# Patient Record
Sex: MALE | Race: BLACK OR AFRICAN AMERICAN | Employment: OTHER | URBAN - METROPOLITAN AREA
[De-identification: names, ages, dates, MRNs, and addresses within clinical notes are randomized per-mention and may not be internally consistent; named-entity substitution may affect disease eponyms.]

---

## 2020-02-25 ENCOUNTER — HOSPITAL ENCOUNTER (INPATIENT)
Age: 40
LOS: 2 days | Discharge: HOME | DRG: 392 | End: 2020-02-28
Attending: EMERGENCY MEDICINE | Admitting: INTERNAL MEDICINE
Payer: COMMERCIAL

## 2020-02-25 DIAGNOSIS — K80.20 CALCULUS OF GALLBLADDER WITHOUT CHOLECYSTITIS WITHOUT OBSTRUCTION: ICD-10-CM

## 2020-02-25 DIAGNOSIS — R10.9 ACUTE ABDOMINAL PAIN: Primary | ICD-10-CM

## 2020-02-25 LAB
ALBUMIN SERPL-MCNC: 4.7 G/DL (ref 3.5–5)
ALBUMIN/GLOB SERPL: 1.1 {RATIO} (ref 1.2–3.5)
ALP SERPL-CCNC: 73 U/L (ref 50–136)
ALT SERPL-CCNC: 68 U/L (ref 12–65)
ANION GAP SERPL CALC-SCNC: 12 MMOL/L (ref 7–16)
AST SERPL-CCNC: 44 U/L (ref 15–37)
BASOPHILS # BLD: 0 K/UL (ref 0–0.2)
BASOPHILS NFR BLD: 0 % (ref 0–2)
BILIRUB SERPL-MCNC: 1 MG/DL (ref 0.2–1.1)
BUN SERPL-MCNC: 16 MG/DL (ref 6–23)
CALCIUM SERPL-MCNC: 10.3 MG/DL (ref 8.3–10.4)
CHLORIDE SERPL-SCNC: 101 MMOL/L (ref 98–107)
CO2 SERPL-SCNC: 24 MMOL/L (ref 21–32)
CREAT SERPL-MCNC: 1.31 MG/DL (ref 0.8–1.5)
DIFFERENTIAL METHOD BLD: ABNORMAL
EOSINOPHIL # BLD: 0 K/UL (ref 0–0.8)
EOSINOPHIL NFR BLD: 0 % (ref 0.5–7.8)
ERYTHROCYTE [DISTWIDTH] IN BLOOD BY AUTOMATED COUNT: 13 % (ref 11.9–14.6)
GLOBULIN SER CALC-MCNC: 4.2 G/DL (ref 2.3–3.5)
GLUCOSE SERPL-MCNC: 114 MG/DL (ref 65–100)
HCT VFR BLD AUTO: 48.8 % (ref 41.1–50.3)
HGB BLD-MCNC: 16.7 G/DL (ref 13.6–17.2)
IMM GRANULOCYTES # BLD AUTO: 0 K/UL (ref 0–0.5)
IMM GRANULOCYTES NFR BLD AUTO: 0 % (ref 0–5)
LIPASE SERPL-CCNC: 159 U/L (ref 73–393)
LYMPHOCYTES # BLD: 1 K/UL (ref 0.5–4.6)
LYMPHOCYTES NFR BLD: 9 % (ref 13–44)
MCH RBC QN AUTO: 28.9 PG (ref 26.1–32.9)
MCHC RBC AUTO-ENTMCNC: 34.2 G/DL (ref 31.4–35)
MCV RBC AUTO: 84.4 FL (ref 79.6–97.8)
MONOCYTES # BLD: 0.6 K/UL (ref 0.1–1.3)
MONOCYTES NFR BLD: 5 % (ref 4–12)
NEUTS SEG # BLD: 9.4 K/UL (ref 1.7–8.2)
NEUTS SEG NFR BLD: 85 % (ref 43–78)
NRBC # BLD: 0 K/UL (ref 0–0.2)
PLATELET # BLD AUTO: 213 K/UL (ref 150–450)
PMV BLD AUTO: 10.5 FL (ref 9.4–12.3)
POTASSIUM SERPL-SCNC: 3.9 MMOL/L (ref 3.5–5.1)
PROT SERPL-MCNC: 8.9 G/DL (ref 6.3–8.2)
RBC # BLD AUTO: 5.78 M/UL (ref 4.23–5.6)
SODIUM SERPL-SCNC: 137 MMOL/L (ref 136–145)
WBC # BLD AUTO: 11.1 K/UL (ref 4.3–11.1)

## 2020-02-25 PROCEDURE — 83690 ASSAY OF LIPASE: CPT

## 2020-02-25 PROCEDURE — 96375 TX/PRO/DX INJ NEW DRUG ADDON: CPT

## 2020-02-25 PROCEDURE — 99285 EMERGENCY DEPT VISIT HI MDM: CPT

## 2020-02-25 PROCEDURE — 80307 DRUG TEST PRSMV CHEM ANLYZR: CPT

## 2020-02-25 PROCEDURE — 96374 THER/PROPH/DIAG INJ IV PUSH: CPT

## 2020-02-25 PROCEDURE — 85025 COMPLETE CBC W/AUTO DIFF WBC: CPT

## 2020-02-25 PROCEDURE — 93005 ELECTROCARDIOGRAM TRACING: CPT | Performed by: EMERGENCY MEDICINE

## 2020-02-25 PROCEDURE — 80053 COMPREHEN METABOLIC PANEL: CPT

## 2020-02-25 PROCEDURE — 74011250636 HC RX REV CODE- 250/636: Performed by: EMERGENCY MEDICINE

## 2020-02-25 PROCEDURE — 84484 ASSAY OF TROPONIN QUANT: CPT

## 2020-02-25 RX ORDER — KETOROLAC TROMETHAMINE 30 MG/ML
30 INJECTION, SOLUTION INTRAMUSCULAR; INTRAVENOUS
Status: COMPLETED | OUTPATIENT
Start: 2020-02-25 | End: 2020-02-25

## 2020-02-25 RX ORDER — FLUOXETINE HYDROCHLORIDE 20 MG/1
20 CAPSULE ORAL DAILY
COMMUNITY

## 2020-02-25 RX ORDER — SODIUM CHLORIDE 0.9 % (FLUSH) 0.9 %
10 SYRINGE (ML) INJECTION
Status: COMPLETED | OUTPATIENT
Start: 2020-02-25 | End: 2020-02-26

## 2020-02-25 RX ORDER — MELOXICAM 7.5 MG/1
7.5 TABLET ORAL DAILY
COMMUNITY

## 2020-02-25 RX ORDER — HYDROMORPHONE HYDROCHLORIDE 1 MG/ML
0.5 INJECTION, SOLUTION INTRAMUSCULAR; INTRAVENOUS; SUBCUTANEOUS
Status: COMPLETED | OUTPATIENT
Start: 2020-02-25 | End: 2020-02-25

## 2020-02-25 RX ORDER — CARISOPRODOL 350 MG/1
350 TABLET ORAL 4 TIMES DAILY
COMMUNITY

## 2020-02-25 RX ORDER — ONDANSETRON 2 MG/ML
4 INJECTION INTRAMUSCULAR; INTRAVENOUS
Status: COMPLETED | OUTPATIENT
Start: 2020-02-25 | End: 2020-02-25

## 2020-02-25 RX ADMIN — HYDROMORPHONE HYDROCHLORIDE 0.5 MG: 1 INJECTION, SOLUTION INTRAMUSCULAR; INTRAVENOUS; SUBCUTANEOUS at 23:17

## 2020-02-25 RX ADMIN — KETOROLAC TROMETHAMINE 30 MG: 30 INJECTION, SOLUTION INTRAMUSCULAR at 23:17

## 2020-02-25 RX ADMIN — ONDANSETRON 4 MG: 2 INJECTION INTRAMUSCULAR; INTRAVENOUS at 23:17

## 2020-02-26 ENCOUNTER — APPOINTMENT (OUTPATIENT)
Dept: GENERAL RADIOLOGY | Age: 40
DRG: 392 | End: 2020-02-26
Attending: EMERGENCY MEDICINE
Payer: COMMERCIAL

## 2020-02-26 ENCOUNTER — APPOINTMENT (OUTPATIENT)
Dept: CT IMAGING | Age: 40
DRG: 392 | End: 2020-02-26
Attending: EMERGENCY MEDICINE
Payer: COMMERCIAL

## 2020-02-26 ENCOUNTER — APPOINTMENT (OUTPATIENT)
Dept: ULTRASOUND IMAGING | Age: 40
DRG: 392 | End: 2020-02-26
Attending: EMERGENCY MEDICINE
Payer: COMMERCIAL

## 2020-02-26 PROBLEM — R74.01 TRANSAMINITIS: Status: ACTIVE | Noted: 2020-02-26

## 2020-02-26 PROBLEM — K52.9 ENTERITIS: Status: ACTIVE | Noted: 2020-02-26

## 2020-02-26 PROBLEM — K80.20 CHOLELITHIASIS: Status: ACTIVE | Noted: 2020-02-26

## 2020-02-26 PROBLEM — F32.9 MDD (MAJOR DEPRESSIVE DISORDER): Status: ACTIVE | Noted: 2020-02-26

## 2020-02-26 LAB
ATRIAL RATE: 145 BPM
CALCULATED P AXIS, ECG09: 48 DEGREES
CALCULATED R AXIS, ECG10: 46 DEGREES
CALCULATED T AXIS, ECG11: 56 DEGREES
DIAGNOSIS, 93000: NORMAL
ETHANOL SERPL-MCNC: <3 MG/DL
P-R INTERVAL, ECG05: 160 MS
Q-T INTERVAL, ECG07: 280 MS
QRS DURATION, ECG06: 84 MS
QTC CALCULATION (BEZET), ECG08: 434 MS
TROPONIN I SERPL-MCNC: <0.02 NG/ML (ref 0.02–0.05)
VENTRICULAR RATE, ECG03: 145 BPM

## 2020-02-26 PROCEDURE — 99218 HC RM OBSERVATION: CPT

## 2020-02-26 PROCEDURE — 74011250636 HC RX REV CODE- 250/636: Performed by: EMERGENCY MEDICINE

## 2020-02-26 PROCEDURE — 65270000029 HC RM PRIVATE

## 2020-02-26 PROCEDURE — 0107U C DIFF TOX AG DETCJ IA STOOL: CPT

## 2020-02-26 PROCEDURE — 74011250636 HC RX REV CODE- 250/636: Performed by: INTERNAL MEDICINE

## 2020-02-26 PROCEDURE — 96376 TX/PRO/DX INJ SAME DRUG ADON: CPT

## 2020-02-26 PROCEDURE — 74011636320 HC RX REV CODE- 636/320: Performed by: EMERGENCY MEDICINE

## 2020-02-26 PROCEDURE — 80074 ACUTE HEPATITIS PANEL: CPT

## 2020-02-26 PROCEDURE — 36600 WITHDRAWAL OF ARTERIAL BLOOD: CPT

## 2020-02-26 PROCEDURE — 87046 STOOL CULTR AEROBIC BACT EA: CPT

## 2020-02-26 PROCEDURE — 87177 OVA AND PARASITES SMEARS: CPT

## 2020-02-26 PROCEDURE — 87389 HIV-1 AG W/HIV-1&-2 AB AG IA: CPT

## 2020-02-26 PROCEDURE — 87045 FECES CULTURE AEROBIC BACT: CPT

## 2020-02-26 PROCEDURE — 74011250637 HC RX REV CODE- 250/637: Performed by: EMERGENCY MEDICINE

## 2020-02-26 PROCEDURE — 74177 CT ABD & PELVIS W/CONTRAST: CPT

## 2020-02-26 PROCEDURE — 74011250637 HC RX REV CODE- 250/637: Performed by: INTERNAL MEDICINE

## 2020-02-26 PROCEDURE — 76705 ECHO EXAM OF ABDOMEN: CPT

## 2020-02-26 PROCEDURE — 71045 X-RAY EXAM CHEST 1 VIEW: CPT

## 2020-02-26 PROCEDURE — 74011000258 HC RX REV CODE- 258: Performed by: EMERGENCY MEDICINE

## 2020-02-26 PROCEDURE — 74011250637 HC RX REV CODE- 250/637: Performed by: PHYSICIAN ASSISTANT

## 2020-02-26 RX ORDER — ONDANSETRON 2 MG/ML
4 INJECTION INTRAMUSCULAR; INTRAVENOUS
Status: DISCONTINUED | OUTPATIENT
Start: 2020-02-26 | End: 2020-02-28 | Stop reason: HOSPADM

## 2020-02-26 RX ORDER — METRONIDAZOLE 500 MG/1
500 TABLET ORAL EVERY 12 HOURS
Status: DISCONTINUED | OUTPATIENT
Start: 2020-02-26 | End: 2020-02-28 | Stop reason: HOSPADM

## 2020-02-26 RX ORDER — SODIUM CHLORIDE 9 MG/ML
125 INJECTION, SOLUTION INTRAVENOUS CONTINUOUS
Status: DISCONTINUED | OUTPATIENT
Start: 2020-02-26 | End: 2020-02-26

## 2020-02-26 RX ORDER — SODIUM CHLORIDE, SODIUM LACTATE, POTASSIUM CHLORIDE, CALCIUM CHLORIDE 600; 310; 30; 20 MG/100ML; MG/100ML; MG/100ML; MG/100ML
200 INJECTION, SOLUTION INTRAVENOUS ONCE
Status: COMPLETED | OUTPATIENT
Start: 2020-02-26 | End: 2020-02-26

## 2020-02-26 RX ORDER — FLUOXETINE HYDROCHLORIDE 20 MG/1
20 CAPSULE ORAL DAILY
Status: DISCONTINUED | OUTPATIENT
Start: 2020-02-26 | End: 2020-02-28 | Stop reason: HOSPADM

## 2020-02-26 RX ORDER — CIPROFLOXACIN 500 MG/1
500 TABLET ORAL EVERY 12 HOURS
Status: DISCONTINUED | OUTPATIENT
Start: 2020-02-26 | End: 2020-02-28 | Stop reason: HOSPADM

## 2020-02-26 RX ORDER — HYDROMORPHONE HYDROCHLORIDE 2 MG/1
1 TABLET ORAL
Status: DISCONTINUED | OUTPATIENT
Start: 2020-02-26 | End: 2020-02-28 | Stop reason: HOSPADM

## 2020-02-26 RX ORDER — SODIUM CHLORIDE 0.9 % (FLUSH) 0.9 %
5-40 SYRINGE (ML) INJECTION EVERY 8 HOURS
Status: DISCONTINUED | OUTPATIENT
Start: 2020-02-26 | End: 2020-02-28 | Stop reason: HOSPADM

## 2020-02-26 RX ORDER — HYOSCYAMINE SULFATE 0.12 MG/1
0.25 TABLET SUBLINGUAL
Status: COMPLETED | OUTPATIENT
Start: 2020-02-26 | End: 2020-02-26

## 2020-02-26 RX ORDER — HYDROMORPHONE HYDROCHLORIDE 1 MG/ML
0.5 INJECTION, SOLUTION INTRAMUSCULAR; INTRAVENOUS; SUBCUTANEOUS
Status: COMPLETED | OUTPATIENT
Start: 2020-02-26 | End: 2020-02-26

## 2020-02-26 RX ORDER — SODIUM CHLORIDE 0.9 % (FLUSH) 0.9 %
5-40 SYRINGE (ML) INJECTION AS NEEDED
Status: DISCONTINUED | OUTPATIENT
Start: 2020-02-26 | End: 2020-02-28 | Stop reason: HOSPADM

## 2020-02-26 RX ORDER — SODIUM CHLORIDE, SODIUM LACTATE, POTASSIUM CHLORIDE, CALCIUM CHLORIDE 600; 310; 30; 20 MG/100ML; MG/100ML; MG/100ML; MG/100ML
1000 INJECTION, SOLUTION INTRAVENOUS ONCE
Status: COMPLETED | OUTPATIENT
Start: 2020-02-26 | End: 2020-02-26

## 2020-02-26 RX ORDER — ONDANSETRON 2 MG/ML
4 INJECTION INTRAMUSCULAR; INTRAVENOUS
Status: COMPLETED | OUTPATIENT
Start: 2020-02-26 | End: 2020-02-26

## 2020-02-26 RX ORDER — HYDROMORPHONE HYDROCHLORIDE 1 MG/ML
0.2 INJECTION, SOLUTION INTRAMUSCULAR; INTRAVENOUS; SUBCUTANEOUS
Status: COMPLETED | OUTPATIENT
Start: 2020-02-26 | End: 2020-02-26

## 2020-02-26 RX ORDER — OXYCODONE HYDROCHLORIDE 5 MG/1
5 TABLET ORAL
Status: DISCONTINUED | OUTPATIENT
Start: 2020-02-26 | End: 2020-02-28 | Stop reason: HOSPADM

## 2020-02-26 RX ADMIN — HYOSCYAMINE SULFATE 0.25 MG: 0.12 TABLET ORAL; SUBLINGUAL at 01:07

## 2020-02-26 RX ADMIN — CIPROFLOXACIN HYDROCHLORIDE 500 MG: 500 TABLET, FILM COATED ORAL at 21:10

## 2020-02-26 RX ADMIN — METRONIDAZOLE 500 MG: 500 TABLET ORAL at 18:19

## 2020-02-26 RX ADMIN — HYDROMORPHONE HYDROCHLORIDE 0.2 MG: 1 INJECTION, SOLUTION INTRAMUSCULAR; INTRAVENOUS; SUBCUTANEOUS at 07:26

## 2020-02-26 RX ADMIN — HYDROMORPHONE HYDROCHLORIDE 1 MG: 2 TABLET ORAL at 15:37

## 2020-02-26 RX ADMIN — Medication 10 ML: at 00:00

## 2020-02-26 RX ADMIN — SODIUM CHLORIDE 100 ML: 900 INJECTION, SOLUTION INTRAVENOUS at 00:00

## 2020-02-26 RX ADMIN — SODIUM CHLORIDE 125 ML/HR: 900 INJECTION, SOLUTION INTRAVENOUS at 08:41

## 2020-02-26 RX ADMIN — HYDROMORPHONE HYDROCHLORIDE 0.5 MG: 1 INJECTION, SOLUTION INTRAMUSCULAR; INTRAVENOUS; SUBCUTANEOUS at 00:38

## 2020-02-26 RX ADMIN — Medication 5 ML: at 21:12

## 2020-02-26 RX ADMIN — SODIUM CHLORIDE, SODIUM LACTATE, POTASSIUM CHLORIDE, AND CALCIUM CHLORIDE 1000 ML: 600; 310; 30; 20 INJECTION, SOLUTION INTRAVENOUS at 02:30

## 2020-02-26 RX ADMIN — SODIUM CHLORIDE, SODIUM LACTATE, POTASSIUM CHLORIDE, AND CALCIUM CHLORIDE 200 ML/HR: 600; 310; 30; 20 INJECTION, SOLUTION INTRAVENOUS at 00:38

## 2020-02-26 RX ADMIN — IOPAMIDOL 100 ML: 755 INJECTION, SOLUTION INTRAVENOUS at 00:00

## 2020-02-26 RX ADMIN — FLUOXETINE 20 MG: 20 CAPSULE ORAL at 15:06

## 2020-02-26 RX ADMIN — ONDANSETRON 4 MG: 2 INJECTION INTRAMUSCULAR; INTRAVENOUS at 16:24

## 2020-02-26 RX ADMIN — ONDANSETRON 4 MG: 2 INJECTION INTRAMUSCULAR; INTRAVENOUS at 04:41

## 2020-02-26 NOTE — H&P
HOSPITALIST H&P/CONSULT  NAME:  Loretta Mohr   Age:  44 y.o.  :   1980   MRN:   [id number]  PCP: Gina Simpson MD  Consulting MD:  Treatment Team: Attending Provider: Joselyn Billnigs DO  HPI:   45 yo AAM with past history of chronic low back pain and MDD presents with worsening abdominal pain that started yesterday afternoon. He said \"I ate a hamburger around noon and then had some abdominal pain. I thought nothing of it but it kept getting worse and then it woke me up and it was so bad I had to come in.\" Patient's mother at the bedside as well. He has had nausea without vomiting. No one else around him has the same symptoms. He denies fevers/chills, chest pain, shortness of breath, or diarrhea. No history of kidney stones. He said he has some low back pain from an injury. Patient denies recreational drug use and says \"I'll drink alcohol every couple of weeks mainly on the weekends. \"     ED Course: bloodwork unremarkable except for very mild transaminitis, troponin negative x1. EKG showing sinus tachycardia. CT abdomen showing possible transverse enteritis, abdominal ultrasound showing cholilithiasis without acute cholecystitis and fatty liver. Given dilaudid, ketorolac, fluids, and Zofran. General surgery consulted with recommendations for hospitalist to admit and will follow as consult. Hospitalist paged for admission. Complete ROS done and is as stated in HPI or otherwise negative  Past Medical History:   Diagnosis Date    Back pain     Depression     Psychiatric disorder       History reviewed. No pertinent surgical history. Prior to Admission Medications   Prescriptions Last Dose Informant Patient Reported? Taking? FLUoxetine (PROZAC) 20 mg capsule   Yes Yes   Sig: Take 20 mg by mouth daily. carisoprodoL (SOMA) 350 mg tablet   Yes Yes   Sig: Take 350 mg by mouth four (4) times daily. meloxicam (MOBIC) 7.5 mg tablet   Yes Yes   Sig: Take 7.5 mg by mouth daily. Facility-Administered Medications: None     Allergies   Allergen Reactions    Flexeril [Cyclobenzaprine] Rash      Social History     Tobacco Use    Smoking status: Never Smoker    Smokeless tobacco: Never Used   Substance Use Topics    Alcohol use: Yes     Comment: occassionally      History reviewed. No pertinent family history. Objective:     Visit Vitals  /84   Pulse 86   Temp 98.5 °F (36.9 °C)   Resp 20   Ht 5' 10\" (1.778 m)   Wt 96.2 kg (212 lb)   SpO2 95%   BMI 30.42 kg/m²      Temp (24hrs), Av.5 °F (36.9 °C), Min:98.5 °F (36.9 °C), Max:98.5 °F (36.9 °C)    Oxygen Therapy  O2 Sat (%): 95 % (20 0645)  Pulse via Oximetry: 99 beats per minute (20 0645)  O2 Device: Room air (20 2300)  Physical Exam:  General:    Alert, cooperative, no distress, appears stated age. Head:   Normocephalic, without obvious abnormality, atraumatic. Nose:  Nares normal. No drainage or sinus tenderness. Lungs:   Clear to auscultation bilaterally. No Wheezing or Rhonchi. No rales. Heart:   Regular rate and rhythm,  no murmur, rub or gallop. Abdomen:   Soft, moderate TTP along RUQ and RLL without rebound tenderness. No fluid wave. Negative Reynoso sign. No CVA tenderness. Extremities: No cyanosis. No edema. No clubbing  Skin:     Texture, turgor normal. No rashes or lesions. Not Jaundiced  Neurologic: Alert and oriented x 3, no focal deficits   Data Review:   Recent Results (from the past 24 hour(s))   EKG, 12 LEAD, INITIAL    Collection Time: 20 11:03 PM   Result Value Ref Range    Ventricular Rate 145 BPM    Atrial Rate 145 BPM    P-R Interval 160 ms    QRS Duration 84 ms    Q-T Interval 280 ms    QTC Calculation (Bezet) 434 ms    Calculated P Axis 48 degrees    Calculated R Axis 46 degrees    Calculated T Axis 56 degrees    Diagnosis       !! AGE AND GENDER SPECIFIC ECG ANALYSIS !!   Sinus tachycardia  Septal infarct , age undetermined  Abnormal ECG  No previous ECGs available METABOLIC PANEL, COMPREHENSIVE    Collection Time: 02/25/20 11:07 PM   Result Value Ref Range    Sodium 137 136 - 145 mmol/L    Potassium 3.9 3.5 - 5.1 mmol/L    Chloride 101 98 - 107 mmol/L    CO2 24 21 - 32 mmol/L    Anion gap 12 7 - 16 mmol/L    Glucose 114 (H) 65 - 100 mg/dL    BUN 16 6 - 23 MG/DL    Creatinine 1.31 0.8 - 1.5 MG/DL    GFR est AA >60 >60 ml/min/1.73m2    GFR est non-AA >60 >60 ml/min/1.73m2    Calcium 10.3 8.3 - 10.4 MG/DL    Bilirubin, total 1.0 0.2 - 1.1 MG/DL    ALT (SGPT) 68 (H) 12 - 65 U/L    AST (SGOT) 44 (H) 15 - 37 U/L    Alk. phosphatase 73 50 - 136 U/L    Protein, total 8.9 (H) 6.3 - 8.2 g/dL    Albumin 4.7 3.5 - 5.0 g/dL    Globulin 4.2 (H) 2.3 - 3.5 g/dL    A-G Ratio 1.1 (L) 1.2 - 3.5     LIPASE    Collection Time: 02/25/20 11:07 PM   Result Value Ref Range    Lipase 159 73 - 393 U/L   CBC WITH AUTOMATED DIFF    Collection Time: 02/25/20 11:07 PM   Result Value Ref Range    WBC 11.1 4.3 - 11.1 K/uL    RBC 5.78 (H) 4.23 - 5.6 M/uL    HGB 16.7 13.6 - 17.2 g/dL    HCT 48.8 41.1 - 50.3 %    MCV 84.4 79.6 - 97.8 FL    MCH 28.9 26.1 - 32.9 PG    MCHC 34.2 31.4 - 35.0 g/dL    RDW 13.0 11.9 - 14.6 %    PLATELET 878 784 - 960 K/uL    MPV 10.5 9.4 - 12.3 FL    ABSOLUTE NRBC 0.00 0.0 - 0.2 K/uL    DF AUTOMATED      NEUTROPHILS 85 (H) 43 - 78 %    LYMPHOCYTES 9 (L) 13 - 44 %    MONOCYTES 5 4.0 - 12.0 %    EOSINOPHILS 0 (L) 0.5 - 7.8 %    BASOPHILS 0 0.0 - 2.0 %    IMMATURE GRANULOCYTES 0 0.0 - 5.0 %    ABS. NEUTROPHILS 9.4 (H) 1.7 - 8.2 K/UL    ABS. LYMPHOCYTES 1.0 0.5 - 4.6 K/UL    ABS. MONOCYTES 0.6 0.1 - 1.3 K/UL    ABS. EOSINOPHILS 0.0 0.0 - 0.8 K/UL    ABS. BASOPHILS 0.0 0.0 - 0.2 K/UL    ABS. IMM. GRANS. 0.0 0.0 - 0.5 K/UL   TROPONIN I    Collection Time: 02/25/20 11:07 PM   Result Value Ref Range    Troponin-I, Qt. <0.02 (L) 0.02 - 0.05 NG/ML     Imaging /Procedures /Studies     Assessment and Plan:      Active Hospital Problems    Diagnosis Date Noted    Cholelithiasis 02/26/2020    MDD (major depressive disorder) 02/26/2020    Enteritis 02/26/2020    Transaminitis 02/26/2020       PLAN    # Worsening abdominal pain with mild transaminitis and fatty liver and possible enteritis on CT imaging  - general surgery consulted and will follow  - keep NPO  - start IVFs  - will hold off on antibiotics for now, if clinically worsens then can start  - ordered hepatitis panel, HIV, UDS, and alcohol level  - trend LFTs    # History of MDD  - continue Prozac    F/E/N: maintenance fluids, replete electrolytes as needed, NPO    Ppx: SCDs for VTE    Code Status: FULL CODE    Disposition: Admit to OBS with plan as above. Discussed with patient and mother at bedside. All questions answered.      Signed By: Alfred Faulkner DO     February 26, 2020

## 2020-02-26 NOTE — CONSULTS
[facility] Consult Note       Primary GI Physician: Eduin Jones MD (new)  Referring Provider:  Michelle Bowers MD    Consult Date:  2/26/2020  Admit Date:  2/25/2020    Chief Complaint:  Enteritis    Subjective:     History of Present Illness:  Patient is a 44 y.o. male with PMH of depression, who is seen in consultation at the request of Dr. Preston Deal for further evaluation of enteritis. Patient presented to the ED  after eating a hamburger for lunch yesterday. Abdominal pain worsened and devloped nausea. + diarrhea which started in the ED. No bleeding or melena. WBC in ED 11, Tbili 1, ALT 68, AST 44, Alk Phos WNL. He does drink alcohol socially, but has not had a drink in 2 weeks. CT abdomen demonstrates enteritis. RUQ US demonstrates gallstone, no evidence of acute cholecystitis. General surgery was consulted for cholecystectomy. Patient denies having any GI issues prior to onset of symptoms. No FHx of IBD. No prior GI work-up. Patient is in [employer] and 2 of his friends who joined him for lunch also developed GI issues - N/V/D. CTAP 2/26/2020:  IMPRESSION:     1. Moderate fluid volume in the ascending and transverse colon, nonspecific but  may be secondary to underlying enteritis.     2. No evidence of appendicitis, colitis, diverticulitis or bowel obstruction. No  renal calculus or hydronephrosis.     US Abd 2/26/2020:  IMPRESSION:     1. Cholelithiasis without sonographic evidence of acute cholecystitis.     2. Fatty liver. No evidence of biliary ductal dilatation.       PMH:  Past Medical History:   Diagnosis Date    Back pain     Depression     Psychiatric disorder        PSH:  History reviewed. No pertinent surgical history. Allergies: Allergies   Allergen Reactions    Flexeril [Cyclobenzaprine] Rash       Home Medications:  Prior to Admission medications    Medication Sig Start Date End Date Taking?  Authorizing Provider   FLUoxetine (PROZAC) 20 mg capsule Take 20 mg by mouth daily. Yes Other, MD [place]   carisoprodoL (SOMA) 350 mg tablet Take 350 mg by mouth four (4) times daily. Yes Other, MD [place]   meloxicam (MOBIC) 7.5 mg tablet Take 7.5 mg by mouth daily. Yes Other, MD [place]       Hospital Medications:  Current Facility-Administered Medications   Medication Dose Route Frequency    sodium chloride (NS) flush 5-40 mL  5-40 mL IntraVENous Q8H    sodium chloride (NS) flush 5-40 mL  5-40 mL IntraVENous PRN    ondansetron (ZOFRAN) injection 4 mg  4 mg IntraVENous Q4H PRN    FLUoxetine (PROzac) capsule 20 mg  20 mg Oral DAILY    0.9% sodium chloride infusion  125 mL/hr IntraVENous CONTINUOUS    oxyCODONE IR (ROXICODONE) tablet 5 mg  5 mg Oral Q6H PRN    HYDROmorphone (DILAUDID) tablet 1 mg  1 mg Oral Q6H PRN       Social History:  Social History     Tobacco Use    Smoking status: Never Smoker    Smokeless tobacco: Never Used   Substance Use Topics    Alcohol use: Yes     Comment: occassionally       Pt denies any history of drug use, blood transfusions, or tattoos. Family History:  History reviewed. No pertinent family history. Review of Systems:  A detailed 10 system ROS is obtained, with pertinent positives as listed above. All others are negative. Diet:      Objective:     Physical Exam:  Vitals:  Visit Vitals  /84 (BP 1 Location: Right arm, BP Patient Position: At rest;Supine)   Pulse 93   Temp 98.2 °F (36.8 °C)   Resp 18   Ht 5' 10\" (1.778 m)   Wt 96.2 kg (212 lb)   SpO2 96%   BMI 30.42 kg/m²     Gen:  Pt is alert, cooperative, no acute distress  Skin:  Extremities and face reveal no rashes. HEENT: Sclerae anicteric. Extra-occular muscles are intact. No oral ulcers. No abnormal pigmentation of the lips. The neck is supple. Cardiovascular: Regular rate and rhythm. No murmurs, gallops, or rubs. Respiratory:  Comfortable breathing with no accessory muscle use. Clear breath sounds anteriorly with no wheezes, rales, or rhonchi.   GI:  Abdomen nondistended, soft, and MILDLY TTP DIFFUSELY < right side with evette efullness noted. No G/R. Normal active bowel sounds. No enlargement of the liver or spleen. No masses palpable. Rectal:  Deferred  Musculoskeletal:  No pitting edema of the lower legs. Neurological:  Gross memory appears intact. Patient is alert and oriented. Psychiatric:  Mood appears appropriate with judgement intact. Lymphatic:  No cervical or supraclavicular adenopathy. Laboratory:    Recent Labs     02/25/20  2307   WBC 11.1   HGB 16.7   HCT 48.8      MCV 84.4      K 3.9      CO2 24   BUN 16   CREA 1.31   CA 10.3   *   AP 73   SGOT 44*   ALT 68*   TBILI 1.0   ALB 4.7   TP 8.9*   LPSE 159          Assessment:     Principal Problem:    Enteritis (2/26/2020)    Active Problems:    Cholelithiasis (2/26/2020)      MDD (major depressive disorder) (2/26/2020)      Transaminitis (2/26/2020)    Generally healthy 45 y/o M who presents with recent onset of RUQ abdominal pain after eating a hamburger yesterday. Now having nausea and diarrhea since ED visit/admission. He has 2 friends who ate lunch with him having similar symptoms. Suspect infectious enteritis/food poisoning. Less likely IBD. Surgery on board, \"No clear evidence that current symptoms are GB related. \" Cholelithiasis, no evidence of acute cholecystitis. Noted elevated transaminases. Probably fatty liver related. T bili and AP normal. Ddx: viral hepatitis, current infectious process, etc.     Plan:     May have clear liquids. Pain mgt per primary. Antiemetics. Trend LFTs, monitor, FU viral hepatitis results, consider expanding liver disease work-up base on clinical course. Check stools for enteric pathogens. If negative, may have imodium prn. Oral Cipro and Flagyl. Further recs based on pt's clinical course. Patient is seen and examined in collaboration with Dr. Sosa Sequeira. Assessment and plan as per Dr. David Parsons.       Braxton Richmond PA-C  [facility]

## 2020-02-26 NOTE — PROGRESS NOTES
SW reviewed patient's chart and conducted a baseline assessment. Discharge plan at this time is as follows:     Care Management Interventions  PCP Verified by CM:  Yes  Mode of Transport at Discharge: Self  Transition of Care Consult (CM Consult): Discharge Planning  Current Support Network: Own Home  Confirm Follow Up Transport: Family  Discharge Location  Discharge Placement: Home with family assistance    Bouchra Louis, [address] Work   [address] [facility]  [email or web address]

## 2020-02-26 NOTE — CONSULTS
[address]  [place], [address] [facility]  [phone]    H&P/Consult Note/Progress Note:   Joy Warren  MRN: [id number]  :1980  Age:39 y.o.    HPI: Joy Warren is a 44 y.o. male who we are asked by the ED to see for cholelithiasis. Pt reports mild RUQ pain that began after eating a hamburger at lunch yesterday. He had a usual hamburger at [facility] and felt fine for 5 1/2 hours. He tried to burp and vomit without success. He thought he was having GERD, but central abdominal pain became worse. His pain worsened throughout the night and became diffuse abdominal pain. He reports nausea but denies vomiting, fever, or diarrhea. He denies jaundice or change in color of urine. His abdominal pain is controlled with pain medication in the ED. He also c/o low back pain, which is chronic. He denies h/o abdominal surgeries. Pt was incontinent of stool in ED. He has now had many episodes of watery diarrhea that he is blaming on IVFs. He also found out that 2 other [employer] personnel that he ate with have same symptoms and are being diagnosed as having food poisoning. WBC in ED 11, Tbili 1, ALT 68, AST 44, Alk Phos WNL. He does drink alcohol socially, but has not had a drink in 2 weeks. Unknown history of gallstones or abnormal LFTs. CT abdomen demonstrates enteritis. RUQ US demonstrates gallstone, no evidence of acute cholecystitis. General surgery was consulted for cholecystectomy. CTAP 2020:  IMPRESSION:     1. Moderate fluid volume in the ascending and transverse colon, nonspecific but  may be secondary to underlying enteritis.     2. No evidence of appendicitis, colitis, diverticulitis or bowel obstruction. No  renal calculus or hydronephrosis. US Abd 2020:  IMPRESSION:     1. Cholelithiasis without sonographic evidence of acute cholecystitis.     2. Fatty liver.  No evidence of biliary ductal dilatation. Past Medical History:   Diagnosis Date    Back pain     Depression     Psychiatric disorder      History reviewed. No pertinent surgical history. Current Facility-Administered Medications   Medication Dose Route Frequency    sodium chloride (NS) flush 5-40 mL  5-40 mL IntraVENous Q8H    sodium chloride (NS) flush 5-40 mL  5-40 mL IntraVENous PRN    ondansetron (ZOFRAN) injection 4 mg  4 mg IntraVENous Q4H PRN    FLUoxetine (PROzac) capsule 20 mg  20 mg Oral DAILY    0.9% sodium chloride infusion  125 mL/hr IntraVENous CONTINUOUS    oxyCODONE IR (ROXICODONE) tablet 5 mg  5 mg Oral Q6H PRN    HYDROmorphone (DILAUDID) tablet 1 mg  1 mg Oral Q6H PRN     Current Outpatient Medications   Medication Sig    FLUoxetine (PROZAC) 20 mg capsule Take 20 mg by mouth daily.  carisoprodoL (SOMA) 350 mg tablet Take 350 mg by mouth four (4) times daily.  meloxicam (MOBIC) 7.5 mg tablet Take 7.5 mg by mouth daily. Flexeril [cyclobenzaprine]  Social History     Socioeconomic History    Marital status: SINGLE     Spouse name: Not on file    Number of children: Not on file    Years of education: Not on file    Highest education level: Not on file   Tobacco Use    Smoking status: Never Smoker    Smokeless tobacco: Never Used   Substance and Sexual Activity    Alcohol use: Yes     Comment: occassionally    Drug use: Not Currently    Sexual activity: Yes     Partners: Female     Social History     Tobacco Use   Smoking Status Never Smoker   Smokeless Tobacco Never Used     History reviewed. No pertinent family history. ROS: The patient has no difficulty with chest pain or shortness of breath. No fever or chills. Comprehensive review of systems was otherwise unremarkable except as noted above.     Physical Exam:   Visit Vitals  /81   Pulse 96   Temp 98.5 °F (36.9 °C)   Resp 18   Ht 5' 10\" (1.778 m)   Wt 212 lb (96.2 kg)   SpO2 98%   BMI 30.42 kg/m²     Constitutional: Alert, oriented, cooperative patient in no acute distress; appears stated age    Eyes:Sclera are clear. EOMs intact  ENMT: no external lesions gross hearing normal; no obvious neck masses, no ear or lip lesions, nares normal  CV: RRR. Normal perfusion  Resp: No JVD. Breathing is  non-labored; no audible wheezing. GI: soft and non-distended; no rebound or guarding. Mild diffuse tenderness, worse in RUQ, but more RLQ   Musculoskeletal: unremarkable with normal function. No embolic signs or cyanosis.    Neuro:  Oriented; moves all 4; no focal deficits  Psychiatric: normal affect and mood, no memory impairment    Recent vitals (if inpt):  Patient Vitals for the past 24 hrs:   BP Temp Pulse Resp SpO2 Height Weight   02/26/20 0843   96 18 98 %     02/26/20 0833 122/81    98 %     02/26/20 0715 134/77    96 %     02/26/20 0645 131/84    95 %     02/26/20 0615 112/70    97 %     02/26/20 0545 116/71  86 20 96 %     02/26/20 0515 133/74  87 20 98 %     02/26/20 0446     96 %     02/26/20 0445 111/70  92 16      02/26/20 0358 122/87  95 20 96 %     02/26/20 0328 136/87  97 20 97 %     02/26/20 0258 146/89  92 16 92 %     02/26/20 0243 137/89  97 16 97 %     02/26/20 0228 (!) 143/94  (!) 101 16 95 %     02/26/20 0213 (!) 149/97  (!) 107 20 98 %     02/26/20 0158 (!) 145/105  (!) 105 16 100 %     02/26/20 0147 (!) 147/97  (!) 108 20 96 %     02/26/20 0145 (!) 157/101  (!) 106 20 100 %     02/26/20 0113 151/86  (!) 114 20 98 %     02/26/20 0058 148/77  (!) 120 20 97 %     02/26/20 0052   (!) 113  99 %     02/26/20 0047 (!) 149/104  (!) 122 26 99 %     02/26/20 0043 120/76  (!) 117 16      02/26/20 0042 127/85  (!) 119 16      02/25/20 2357 (!) 156/98  (!) 145 20 98 %     02/25/20 2348 (!) 164/104  (!) 143 20 96 %     02/25/20 2342 (!) 154/104  (!) 142 20 95 %     02/25/20 2326 (!) 138/106  (!) 139 24 95 %     02/25/20 2221 (!) 139/99 98.5 °F (36.9 °C) (!) 142 24 99 % 5' 10\" (1.778 m) 212 lb (96.2 kg)       Labs:  Recent Labs     02/25/20  2307   WBC 11.1   HGB 16.7         K 3.9      CO2 24   BUN 16   CREA 1.31   *   TBILI 1.0   SGOT 44*   ALT 68*   AP 73   LPSE 159   TROIQ <0.02*       Lab Results   Component Value Date/Time    WBC 11.1 02/25/2020 11:07 PM    HGB 16.7 02/25/2020 11:07 PM    PLATELET 217 48/01/6034 11:07 PM    Sodium 137 02/25/2020 11:07 PM    Potassium 3.9 02/25/2020 11:07 PM    Chloride 101 02/25/2020 11:07 PM    CO2 24 02/25/2020 11:07 PM    BUN 16 02/25/2020 11:07 PM    Creatinine 1.31 02/25/2020 11:07 PM    Glucose 114 (H) 02/25/2020 11:07 PM    Bilirubin, total 1.0 02/25/2020 11:07 PM    AST (SGOT) 44 (H) 02/25/2020 11:07 PM    ALT (SGPT) 68 (H) 02/25/2020 11:07 PM    Alk. phosphatase 73 02/25/2020 11:07 PM    Lipase 159 02/25/2020 11:07 PM    Troponin-I, Qt. <0.02 (L) 02/25/2020 11:07 PM       I reviewed recent labs and recent radiologic studies. I independently reviewed radiology images for studies I described above or studies I have ordered.    Admission date (for inpatients): 2/25/2020   * No surgery found *  * No surgery found *    ASSESSMENT/PLAN:  Problem List  Never Reviewed          Codes Class Noted    Cholelithiasis ICD-10-CM: K80.20  ICD-9-CM: 574.20  2/26/2020        MDD (major depressive disorder) ICD-10-CM: F32.9  ICD-9-CM: 296.20  2/26/2020        * (Principal) Enteritis ICD-10-CM: K52.9  ICD-9-CM: 558.9  2/26/2020        Transaminitis ICD-10-CM: R74.0  ICD-9-CM: 790.4  2/26/2020            Principal Problem:    Enteritis (2/26/2020)    Active Problems:    Cholelithiasis (2/26/2020)      MDD (major depressive disorder) (2/26/2020)      Transaminitis (2/26/2020)       Plan:    NPO for now, but can advance diet per GI rec's  IVFs for hydration  Pain/nausea control  Follow labs  Possible ascending/transverse colon enteritis, most likely food poisoning by history and now other cases at base with same timing- Recommend GI consult. Cholelithiasis, no evidence of acute cholecystitis. Eventual cholecystectomy and liver biopsy- No clear evidence that current symptoms are GB related  Will follow with you    I have personally performed a face-to-face diagnostic evaluation and management  service on this patient. I have independently seen the patient. I have independently obtained the above history from the patient/family. I have independently examined the patient with above findings. I have independently reviewed data/labs for this patient and developed the above plan of care (MDM).   Signed: Dereck Paige MD, FACS

## 2020-02-26 NOTE — ED PROVIDER NOTES
77-year-old male sudden onset of right-sided abdominal pain at 5 PM.  Seems to radiate to his back as well. Describes a sharp or constant. No vomiting diarrhea. No fever. No chest pain shortness of breath. No pain like this before. No history of abdominal surgery. The history is provided by the patient. Abdominal Pain    This is a new problem. The current episode started 6 to 12 hours ago. The problem occurs constantly. The problem has not changed since onset. The pain is located in the RUQ and RLQ. The quality of the pain is sharp. The pain is moderate. Associated symptoms include nausea. Pertinent negatives include no fever, no diarrhea, no hematochezia, no melena, no vomiting, no constipation, no dysuria, no frequency, no hematuria, no headaches, no chest pain and no back pain. Nothing worsens the pain. The pain is relieved by nothing. His past medical history does not include gallstones, UTI, pancreatitis, diverticulitis, DM or kidney stones. The patient's surgical history non-contributory. Past Medical History:   Diagnosis Date    Back pain     Depression     Psychiatric disorder        History reviewed. No pertinent surgical history. History reviewed. No pertinent family history.     Social History     Socioeconomic History    Marital status: SINGLE     Spouse name: Not on file    Number of children: Not on file    Years of education: Not on file    Highest education level: Not on file   Occupational History    Not on file   Social Needs    Financial resource strain: Not on file    Food insecurity:     Worry: Not on file     Inability: Not on file    Transportation needs:     Medical: Not on file     Non-medical: Not on file   Tobacco Use    Smoking status: Never Smoker    Smokeless tobacco: Never Used   Substance and Sexual Activity    Alcohol use: Yes     Comment: occassionally    Drug use: Not Currently    Sexual activity: Yes     Partners: Female   Lifestyle    Physical activity:     Days per week: Not on file     Minutes per session: Not on file    Stress: Not on file   Relationships    Social connections:     Talks on phone: Not on file     Gets together: Not on file     Attends Je[employer]'s witness service: Not on file     Active member of club or organization: Not on file     Attends meetings of clubs or organizations: Not on file     Relationship status: Not on file    Intimate partner violence:     Fear of current or ex partner: Not on file     Emotionally abused: Not on file     Physically abused: Not on file     Forced sexual activity: Not on file   Other Topics Concern    Not on file   Social History Narrative    Not on file         ALLERGIES: Flexeril [cyclobenzaprine]    Review of Systems   Constitutional: Negative for chills and fever. Respiratory: Negative for cough and shortness of breath. Cardiovascular: Negative for chest pain and palpitations. Gastrointestinal: Positive for abdominal pain and nausea. Negative for constipation, diarrhea, hematochezia, melena and vomiting. Genitourinary: Negative for dysuria, flank pain, frequency and hematuria. Musculoskeletal: Negative for back pain and neck pain. Skin: Negative for color change and rash. Neurological: Negative for syncope and headaches. All other systems reviewed and are negative. Vitals:    02/25/20 2221   BP: (!) 139/99   Pulse: (!) 142   Resp: 24   Temp: 98.5 °F (36.9 °C)   SpO2: 99%   Weight: 96.2 kg (212 lb)   Height: 5' 10\" (1.778 m)            Physical Exam  Vitals signs and nursing note reviewed. Constitutional:       General: He is not in acute distress. Appearance: He is well-developed. HENT:      Head: Normocephalic and atraumatic. Right Ear: External ear normal.      Left Ear: External ear normal.      Mouth/Throat:      Pharynx: No oropharyngeal exudate. Eyes:      Conjunctiva/sclera: Conjunctivae normal.      Pupils: Pupils are equal, round, and reactive to light. Neck:      Musculoskeletal: Normal range of motion and neck supple. Cardiovascular:      Rate and Rhythm: Normal rate and regular rhythm. Heart sounds: No murmur. Pulmonary:      Effort: No respiratory distress. Breath sounds: Normal breath sounds. Abdominal:      General: Bowel sounds are normal.      Palpations: Abdomen is soft. There is no mass. Tenderness: There is generalized abdominal tenderness. There is no guarding or rebound. Hernia: No hernia is present. Comments: Essentially tender to moderate palpation throughout the entire abdomen, worse on the right. Cannot elicit any obvious rebound. Skin:     General: Skin is warm and dry. Neurological:      Mental Status: He is alert and oriented to person, place, and time. Gait: Gait normal.      Comments: Nl speech   Psychiatric:         Speech: Speech normal.          MDM  Number of Diagnoses or Management Options  Diagnosis management comments: Check urinalysis and screening lab work. Numerous etiologies possible. Imaging dependent upon lab results.        Amount and/or Complexity of Data Reviewed  Clinical lab tests: ordered and reviewed  Tests in the radiology section of CPT®: ordered and reviewed    Risk of Complications, Morbidity, and/or Mortality  Presenting problems: moderate  Diagnostic procedures: minimal  Management options: low    Patient Progress  Patient progress: stable         Procedures      Results Include:    Recent Results (from the past 24 hour(s))   METABOLIC PANEL, COMPREHENSIVE    Collection Time: 02/25/20 11:07 PM   Result Value Ref Range    Sodium 137 136 - 145 mmol/L    Potassium 3.9 3.5 - 5.1 mmol/L    Chloride 101 98 - 107 mmol/L    CO2 24 21 - 32 mmol/L    Anion gap 12 7 - 16 mmol/L    Glucose 114 (H) 65 - 100 mg/dL    BUN 16 6 - 23 MG/DL    Creatinine 1.31 0.8 - 1.5 MG/DL    GFR est AA >60 >60 ml/min/1.73m2    GFR est non-AA >60 >60 ml/min/1.73m2    Calcium 10.3 8.3 - 10.4 MG/DL Bilirubin, total 1.0 0.2 - 1.1 MG/DL    ALT (SGPT) 68 (H) 12 - 65 U/L    AST (SGOT) 44 (H) 15 - 37 U/L    Alk. phosphatase 73 50 - 136 U/L    Protein, total 8.9 (H) 6.3 - 8.2 g/dL    Albumin 4.7 3.5 - 5.0 g/dL    Globulin 4.2 (H) 2.3 - 3.5 g/dL    A-G Ratio 1.1 (L) 1.2 - 3.5     LIPASE    Collection Time: 02/25/20 11:07 PM   Result Value Ref Range    Lipase 159 73 - 393 U/L   CBC WITH AUTOMATED DIFF    Collection Time: 02/25/20 11:07 PM   Result Value Ref Range    WBC 11.1 4.3 - 11.1 K/uL    RBC 5.78 (H) 4.23 - 5.6 M/uL    HGB 16.7 13.6 - 17.2 g/dL    HCT 48.8 41.1 - 50.3 %    MCV 84.4 79.6 - 97.8 FL    MCH 28.9 26.1 - 32.9 PG    MCHC 34.2 31.4 - 35.0 g/dL    RDW 13.0 11.9 - 14.6 %    PLATELET 948 897 - 083 K/uL    MPV 10.5 9.4 - 12.3 FL    ABSOLUTE NRBC 0.00 0.0 - 0.2 K/uL    DF AUTOMATED      NEUTROPHILS 85 (H) 43 - 78 %    LYMPHOCYTES 9 (L) 13 - 44 %    MONOCYTES 5 4.0 - 12.0 %    EOSINOPHILS 0 (L) 0.5 - 7.8 %    BASOPHILS 0 0.0 - 2.0 %    IMMATURE GRANULOCYTES 0 0.0 - 5.0 %    ABS. NEUTROPHILS 9.4 (H) 1.7 - 8.2 K/UL    ABS. LYMPHOCYTES 1.0 0.5 - 4.6 K/UL    ABS. MONOCYTES 0.6 0.1 - 1.3 K/UL    ABS. EOSINOPHILS 0.0 0.0 - 0.8 K/UL    ABS. BASOPHILS 0.0 0.0 - 0.2 K/UL    ABS. IMM. GRANS. 0.0 0.0 - 0.5 K/UL     Ct Abd Pelv W Cont    Result Date: 2/26/2020  CT abdomen and pelvis with contrast COMPARISON: None. INDICATION: Abdominal pain, worse in the right side. Check for appendicitis, kidney stone of cholecystitis. TECHNIQUE: CT imaging was performed of the abdomen and pelvis following the uncomplicated administration of intravenous contrast (Isovue 370, 100 mL). Oral contrast was administered. Radiation dose reduction techniques were used for this study:  Our CT scanners use one or all of the following: Automated exposure control, adjustment of the mA and/or kVp according to patient's size, iterative reconstruction. FINDINGS: Visualized lung bases are unremarkable. Heart is mildly enlarged. Abdomen findings:  There is suggestion of fatty infiltration of the liver. The gallbladder, pancreas, spleen, adrenal glands, and abdominal aorta and the kidneys are unremarkable. No hydronephrosis. Stomach is normal in contour. Small bowel loops are normal in caliber. No small bowel obstruction. No evidence of lymphadenopathy. Pelvic findings: There is moderate fluid volume in the right colon and the transverse colon. Colon is normal in course and caliber. Appendix is within normal limits. Urinary bladder is normal in contour. There is no free air or free fluid. Surrounding bones are intact. IMPRESSION: 1. Moderate fluid volume in the ascending and transverse colon, nonspecific but may be secondary to underlying enteritis. 2. No evidence of appendicitis, colitis, diverticulitis or bowel obstruction. No renal calculus or hydronephrosis. 2:29 AM  Heart rate is improved. No urine as of yet. Some tenderness in the right side of the abdomen on exam.  Is hyperventilating has improved some. Went slightly hypoxemic after dose of pain medication. Feel I cannot give him much more pain medication with regard to opiates. We will try some antispasmodics. Would wonder if some diarrhea is impending. Will check ultrasound of gallbladder. Ct Abd Pelv W Cont    Result Date: 2/26/2020  CT abdomen and pelvis with contrast COMPARISON: None. INDICATION: Abdominal pain, worse in the right side. Check for appendicitis, kidney stone of cholecystitis. TECHNIQUE: CT imaging was performed of the abdomen and pelvis following the uncomplicated administration of intravenous contrast (Isovue 370, 100 mL). Oral contrast was administered. Radiation dose reduction techniques were used for this study:  Our CT scanners use one or all of the following: Automated exposure control, adjustment of the mA and/or kVp according to patient's size, iterative reconstruction. FINDINGS: Visualized lung bases are unremarkable. Heart is mildly enlarged.  Abdomen findings: There is suggestion of fatty infiltration of the liver. The gallbladder, pancreas, spleen, adrenal glands, and abdominal aorta and the kidneys are unremarkable. No hydronephrosis. Stomach is normal in contour. Small bowel loops are normal in caliber. No small bowel obstruction. No evidence of lymphadenopathy. Pelvic findings: There is moderate fluid volume in the right colon and the transverse colon. Colon is normal in course and caliber. Appendix is within normal limits. Urinary bladder is normal in contour. There is no free air or free fluid. Surrounding bones are intact. IMPRESSION: 1. Moderate fluid volume in the ascending and transverse colon, nonspecific but may be secondary to underlying enteritis. 2. No evidence of appendicitis, colitis, diverticulitis or bowel obstruction. No renal calculus or hydronephrosis. [address]    Result Date: 2/26/2020  Limited ultrasound abdomen INDICATION: Right-sided abdominal pain. Evaluate gallbladder COMPARISON: CT yesterday TECHNIQUE: Sonographic grayscale imaging of the abdomen was performed. FINDINGS: There is fatty infiltration of the liver. Liver is normal in contour and size and measures 14.3 cm. There is cholelithiasis. No gallbladder wall thickening, or pericholecystic fluid. Sonographer reports negative sonographic Reynoso's sign, although patient had received pain medication prior to the exam. No intra or extrahepatic biliary ductal dilatation. Common bile duct measures 3 mm. Right kidney is unremarkable without hydronephrosis and measures 9.1 cm. Pancreas and abdominal aorta are not well seen due to overlying bowel gas. No evidence of ascites. IMPRESSION: 1. Cholelithiasis without sonographic evidence of acute cholecystitis. 2. Fatty liver. No evidence of biliary ductal dilatation. Xr Chest Port    Result Date: 2/26/2020  Portable chest xray  COMPARISON: None CLINICAL HISTORY: Tachypnea. FINDINGS: Lungs are underinflated.  Increased interstitial prominence, likely due to vascular crowding. No focal consolidation, pneumothorax or pleural effusion. Heart is enlarged. Mediastinal contour is within normal limits. Surrounding bones are unremarkable. IMPRESSION: 1. Underinflated lungs. No evidence of acute pulmonary disease. 6:11 AM  Still significant pain on palpation. Discussed with surgery. They will see in consultation. Asking the hospitalist to admit for observation.

## 2020-02-26 NOTE — PROGRESS NOTES
Problem: Falls - Risk of  Goal: *Absence of Falls  Description  Document Yaneli Viveros Fall Risk and appropriate interventions in the flowsheet.   Outcome: Progressing Towards Goal  Note: Fall Risk Interventions:            Medication Interventions: Teach patient to arise slowly                   Problem: Patient Education: Go to Patient Education Activity  Goal: Patient/Family Education  Outcome: Progressing Towards Goal

## 2020-02-26 NOTE — PROGRESS NOTES
SW met with patient, confirmed demographic information. Patient reports being independent at home, does not use assistive devices to ambulate, and denies any falls. Patient is also established with primary care at the [place] and is seen regularly. No needs identified from SW at this time. Patient given [facility]ela verbal education and contact information.      Corona Suresh, [address] [facility]    [address] [facility]  [email or web address]

## 2020-02-26 NOTE — PROGRESS NOTES
02/26/20 1132   Dual Skin Pressure Injury Assessment   Dual Skin Pressure Injury Assessment WDL   Second Care Provider (Based on [address] [place] [address]) Yamilet Blancas   Skin Integumentary   Skin Integumentary (WDL) WDL    Pressure  Injury Documentation No Pressure Injury Noted-Pressure Ulcer Prevention Initiated   Osmany Scale (11years of age and older)   Sensory Perception 4   Moisture 4   Activity 4   Mobility 4   Nutrition 3   Nutrition Interventions Document food/fluid/supplement intake   Friction and Shear 3   Osmany Score 22

## 2020-02-26 NOTE — PROGRESS NOTES
Problem: Falls - Risk of  Goal: *Absence of Falls  Description  Document [place] Lepe Fall Risk and appropriate interventions in the flowsheet.   2/26/2020 1606 by Pradip Fitzgerald RN  Outcome: Progressing Towards Goal  Note: Fall Risk Interventions:            Medication Interventions: Teach patient to arise slowly                2/26/2020 1221 by Pradip Fitzgerald RN  Outcome: Progressing Towards Goal  Note: Fall Risk Interventions:            Medication Interventions: Teach patient to arise slowly                   Problem: Patient Education: Go to Patient Education Activity  Goal: Patient/Family Education  2/26/2020 1606 by Pradip Fitzgerald RN  Outcome: Progressing Towards Goal  2/26/2020 1221 by Pradip Fitzgerald RN  Outcome: Progressing Towards Goal

## 2020-02-26 NOTE — ED NOTES
TRANSFER - OUT REPORT:    Verbal report given to RN Shiloh San on Izabel Boykin  being transferred to  for routine progression of care       Report consisted of patients Situation, Background, Assessment and   Recommendations(SBAR). Information from the following report(s) SBAR, ED Summary, Intake/Output, MAR and Cardiac Rhythm NSR was reviewed with the receiving nurse. Lines:   Peripheral IV 02/26/20 Left Antecubital (Active)   Site Assessment Clean, dry, & intact 2/26/2020  1:00 AM   Phlebitis Assessment 0 2/26/2020  1:00 AM   Infiltration Assessment 0 2/26/2020  1:00 AM   Dressing Status Clean, dry, & intact 2/26/2020  1:00 AM        Opportunity for questions and clarification was provided.       Patient transported with:   clickTRUE

## 2020-02-26 NOTE — PROGRESS NOTES
Prn Dilaudid 1 mg given po for 5/10 sharp intermittent abd pain. Safety measures in place. Continue to monitor.

## 2020-02-27 LAB
ALBUMIN SERPL-MCNC: 3.6 G/DL (ref 3.5–5)
ALBUMIN/GLOB SERPL: 0.9 {RATIO} (ref 1.2–3.5)
ALP SERPL-CCNC: 52 U/L (ref 50–136)
ALT SERPL-CCNC: 55 U/L (ref 12–65)
AMPHET UR QL SCN: NEGATIVE
ANION GAP SERPL CALC-SCNC: 3 MMOL/L (ref 7–16)
AST SERPL-CCNC: 35 U/L (ref 15–37)
BASOPHILS # BLD: 0 K/UL (ref 0–0.2)
BASOPHILS NFR BLD: 0 % (ref 0–2)
BENZODIAZ UR QL: NEGATIVE
BILIRUB SERPL-MCNC: 0.6 MG/DL (ref 0.2–1.1)
BUN SERPL-MCNC: 15 MG/DL (ref 6–23)
C DIFF GDH STL QL: NORMAL
C DIFF TOX A+B STL QL IA: NORMAL
CALCIUM SERPL-MCNC: 8.8 MG/DL (ref 8.3–10.4)
CANNABINOIDS UR QL SCN: NEGATIVE
CHLORIDE SERPL-SCNC: 106 MMOL/L (ref 98–107)
CLINICAL CONSIDERATION: NORMAL
CO2 SERPL-SCNC: 27 MMOL/L (ref 21–32)
COCAINE UR QL SCN: NEGATIVE
CREAT SERPL-MCNC: 1.22 MG/DL (ref 0.8–1.5)
DIFFERENTIAL METHOD BLD: ABNORMAL
EOSINOPHIL # BLD: 0.1 K/UL (ref 0–0.8)
EOSINOPHIL NFR BLD: 2 % (ref 0.5–7.8)
ERYTHROCYTE [DISTWIDTH] IN BLOOD BY AUTOMATED COUNT: 13.4 % (ref 11.9–14.6)
GLOBULIN SER CALC-MCNC: 3.9 G/DL (ref 2.3–3.5)
GLUCOSE SERPL-MCNC: 86 MG/DL (ref 65–100)
HAV IGM SERPL QL IA: NEGATIVE
HBV CORE IGM SERPL QL IA: NEGATIVE
HBV SURFACE AG SERPL QL IA: NEGATIVE
HCT VFR BLD AUTO: 43.9 % (ref 41.1–50.3)
HCV AB S/CO SERPL IA: 0.1 S/CO RATIO (ref 0–0.9)
HGB BLD-MCNC: 14.5 G/DL (ref 13.6–17.2)
HIV 1+2 AB+HIV1 P24 AG SERPL QL IA: NON REACTIVE
IMM GRANULOCYTES # BLD AUTO: 0 K/UL (ref 0–0.5)
IMM GRANULOCYTES NFR BLD AUTO: 0 % (ref 0–5)
INTERPRETATION: NORMAL
LYMPHOCYTES # BLD: 0.9 K/UL (ref 0.5–4.6)
LYMPHOCYTES NFR BLD: 24 % (ref 13–44)
MCH RBC QN AUTO: 28.7 PG (ref 26.1–32.9)
MCHC RBC AUTO-ENTMCNC: 33 G/DL (ref 31.4–35)
MCV RBC AUTO: 86.9 FL (ref 79.6–97.8)
MONOCYTES # BLD: 0.4 K/UL (ref 0.1–1.3)
MONOCYTES NFR BLD: 11 % (ref 4–12)
NEUTS SEG # BLD: 2.3 K/UL (ref 1.7–8.2)
NEUTS SEG NFR BLD: 63 % (ref 43–78)
NRBC # BLD: 0 K/UL (ref 0–0.2)
OPIATES UR QL: POSITIVE
PCR REFLEX: NORMAL
PLATELET # BLD AUTO: 178 K/UL (ref 150–450)
PMV BLD AUTO: 10.7 FL (ref 9.4–12.3)
POTASSIUM SERPL-SCNC: 3.6 MMOL/L (ref 3.5–5.1)
PROT SERPL-MCNC: 7.5 G/DL (ref 6.3–8.2)
RBC # BLD AUTO: 5.05 M/UL (ref 4.23–5.6)
SODIUM SERPL-SCNC: 136 MMOL/L (ref 136–145)
WBC # BLD AUTO: 3.7 K/UL (ref 4.3–11.1)

## 2020-02-27 PROCEDURE — 74011250637 HC RX REV CODE- 250/637: Performed by: PHYSICIAN ASSISTANT

## 2020-02-27 PROCEDURE — 36415 COLL VENOUS BLD VENIPUNCTURE: CPT

## 2020-02-27 PROCEDURE — 80053 COMPREHEN METABOLIC PANEL: CPT

## 2020-02-27 PROCEDURE — 80307 DRUG TEST PRSMV CHEM ANLYZR: CPT

## 2020-02-27 PROCEDURE — 85025 COMPLETE CBC W/AUTO DIFF WBC: CPT

## 2020-02-27 PROCEDURE — 74011250637 HC RX REV CODE- 250/637: Performed by: INTERNAL MEDICINE

## 2020-02-27 PROCEDURE — 65270000029 HC RM PRIVATE

## 2020-02-27 RX ADMIN — CIPROFLOXACIN HYDROCHLORIDE 500 MG: 500 TABLET, FILM COATED ORAL at 08:47

## 2020-02-27 RX ADMIN — CIPROFLOXACIN HYDROCHLORIDE 500 MG: 500 TABLET, FILM COATED ORAL at 20:44

## 2020-02-27 RX ADMIN — Medication 5 ML: at 06:02

## 2020-02-27 RX ADMIN — FLUOXETINE 20 MG: 20 CAPSULE ORAL at 08:47

## 2020-02-27 RX ADMIN — METRONIDAZOLE 500 MG: 500 TABLET ORAL at 17:04

## 2020-02-27 RX ADMIN — Medication 10 ML: at 20:45

## 2020-02-27 RX ADMIN — METRONIDAZOLE 500 MG: 500 TABLET ORAL at 06:02

## 2020-02-27 NOTE — PROGRESS NOTES
Interdisciplinary team rounds were held 2/27/2020 with the following team members:Care Management, Nursing, Nutrition, Pharmacy, Physical Therapy and Physician. Plan of care discussed. See clinical pathway and/or care plan for interventions and desired outcomes.

## 2020-02-27 NOTE — PROGRESS NOTES
[facility] Progress Note         Admit Date:  2/25/2020  Today's Date:  2/27/2020    CC:  Enteritis    Subjective:     Patient reports improved abdominal pain and nausea although both persistent. Also, still with diarrhea. Tolerating CL diet. Medications:   Current Facility-Administered Medications   Medication Dose Route Frequency    sodium chloride (NS) flush 5-40 mL  5-40 mL IntraVENous Q8H    sodium chloride (NS) flush 5-40 mL  5-40 mL IntraVENous PRN    ondansetron (ZOFRAN) injection 4 mg  4 mg IntraVENous Q4H PRN    FLUoxetine (PROzac) capsule 20 mg  20 mg Oral DAILY    oxyCODONE IR (ROXICODONE) tablet 5 mg  5 mg Oral Q6H PRN    HYDROmorphone (DILAUDID) tablet 1 mg  1 mg Oral Q6H PRN    ciprofloxacin HCl (CIPRO) tablet 500 mg  500 mg Oral Q12H    metroNIDAZOLE (FLAGYL) tablet 500 mg  500 mg Oral Q12H       Review of Systems:  ROS was obtained, with pertinent positives as listed above. No chest pain or SOB. Diet:      Objective:   Vitals:  Visit Vitals  /78 (BP 1 Location: Left arm, BP Patient Position: At rest;Head of bed elevated (Comment degrees))   Pulse 66   Temp 98.1 °F (36.7 °C)   Resp 20   Ht 5' 10\" (1.778 m)   Wt 96.2 kg (212 lb)   SpO2 96%   BMI 30.42 kg/m²     Intake/Output:  No intake/output data recorded. No intake/output data recorded. Exam:      Data Review (Labs):    Recent Labs     02/27/20  0541 02/25/20  2307   WBC 3.7* 11.1   HGB 14.5 16.7   HCT 43.9 48.8    213   MCV 86.9 84.4    137   K 3.6 3.9    101   CO2 27 24   BUN 15 16   CREA 1.22 1.31   CA 8.8 10.3   GLU 86 114*   AP 52 73   SGOT 35 44*   ALT 55 68*   TBILI 0.6 1.0   ALB 3.6 4.7   TP 7.5 8.9*   LPSE  --  159     CTAP 2/26/2020:  IMPRESSION:     1. Moderate fluid volume in the ascending and transverse colon, nonspecific but  may be secondary to underlying enteritis.     2. No evidence of appendicitis, colitis, diverticulitis or bowel obstruction.  No  renal calculus or hydronephrosis.     US Abd 2/26/2020:  IMPRESSION:     1. Cholelithiasis without sonographic evidence of acute cholecystitis.     2. Fatty liver. No evidence of biliary ductal dilatation. Assessment:     Principal Problem:  Enteritis (2/26/2020)    Active Problems:  Cholelithiasis (2/26/2020)  MDD (major depressive disorder) (2/26/2020)  Transaminitis (2/26/2020) - resolved    Generally healthy 43 y/o M who presents with acute onset of RUQ abdominal pain after eating a hamburger yesterday. Nausea and diarrhea since ED visit/admission, resolved today. He has 2 friends who ate lunch with him also having similar symptoms. Infectious enteritis suspected, less likely IBD, clinically improved at this point. C diff negative.      Surgery on board, \"No clear evidence that current symptoms are GB related. \" Cholelithiasis, no evidence of acute cholecystitis.       Noted elevated transaminases on presentation, normalized. Fatty liver on US. Viral hepatitis negative. Plan:     Enteritis, presumed infectious. Clinically slowly improving. Advance diet. Continue supportive care. Gallstones, asymptomatic. Explained to him about biliary colic symptoms should he develop those in the future. Elevated LFTs. Improving. Perhaps from dehydration.     Manuel Soria MD covering for Dr Rhonda Newton  [facility]

## 2020-02-27 NOTE — PROGRESS NOTES
[address]  Sabine Jarquin, [address] [facility]  [phone]    H&P/Consult Note/Progress Note:   Jenny Mukherjee  MRN: [id number]  :1980  Age:39 y.o.    HPI: Jenny Mukherjee is a 44 y.o. male who we are asked by the ED to see for cholelithiasis. Pt reports mild RUQ pain that began after eating a hamburger at lunch yesterday. He had a usual hamburger at [facility] [place] and felt fine for 5 1/2 hours. He tried to burp and vomit without success. He thought he was having GERD, but central abdominal pain became worse. His pain worsened throughout the night and became diffuse abdominal pain. He reports nausea but denies vomiting, fever, or diarrhea. He denies jaundice or change in color of urine. His abdominal pain is controlled with pain medication in the ED. He also c/o low back pain, which is chronic. He denies h/o abdominal surgeries. Pt was incontinent of stool in ED. He has now had many episodes of watery diarrhea that he is blaming on IVFs. He also found out that 2 other [facility] personnel that he ate with have same symptoms and are being diagnosed as having food poisoning. WBC in ED 11, Tbili 1, ALT 68, AST 44, Alk Phos WNL. He does drink alcohol socially, but has not had a drink in 2 weeks. Unknown history of gallstones or abnormal LFTs. CT abdomen demonstrates enteritis. RUQ US demonstrates gallstone, no evidence of acute cholecystitis. General surgery was consulted for cholecystectomy. 20: Awake in bed, reports improved pain. Denies any n/v overnight. Tolerated CLD. GI following. LFTs WNL. WBC 3.7    Past Medical History:   Diagnosis Date    Back pain     Depression     Psychiatric disorder      History reviewed. No pertinent surgical history.   Current Facility-Administered Medications   Medication Dose Route Frequency    sodium chloride (NS) flush 5-40 mL  5-40 mL IntraVENous Q8H    sodium chloride (NS) flush 5-40 mL  5-40 mL IntraVENous PRN    ondansetron (ZOFRAN) injection 4 mg  4 mg IntraVENous Q4H PRN    FLUoxetine (PROzac) capsule 20 mg  20 mg Oral DAILY    oxyCODONE IR (ROXICODONE) tablet 5 mg  5 mg Oral Q6H PRN    HYDROmorphone (DILAUDID) tablet 1 mg  1 mg Oral Q6H PRN    ciprofloxacin HCl (CIPRO) tablet 500 mg  500 mg Oral Q12H    metroNIDAZOLE (FLAGYL) tablet 500 mg  500 mg Oral Q12H     Flexeril [cyclobenzaprine]  Social History     Socioeconomic History    Marital status: SINGLE     Spouse name: Not on file    Number of children: Not on file    Years of education: Not on file    Highest education level: Not on file   Tobacco Use    Smoking status: Never Smoker    Smokeless tobacco: Never Used   Substance and Sexual Activity    Alcohol use: Yes     Comment: occassionally    Drug use: Not Currently    Sexual activity: Yes     Partners: Female     Social History     Tobacco Use   Smoking Status Never Smoker   Smokeless Tobacco Never Used     History reviewed. No pertinent family history. ROS: The patient has no difficulty with chest pain or shortness of breath. No fever or chills. Comprehensive review of systems was otherwise unremarkable except as noted above. Physical Exam:   Visit Vitals  /78 (BP 1 Location: Left arm, BP Patient Position: At rest;Head of bed elevated (Comment degrees))   Pulse 66   Temp 98.1 °F (36.7 °C)   Resp 20   Ht 5' 10\" (1.778 m)   Wt 212 lb (96.2 kg)   SpO2 96%   BMI 30.42 kg/m²     Constitutional: Alert, oriented, cooperative patient in no acute distress; appears stated age    Eyes:Sclera are clear. EOMs intact  ENMT: no external lesions gross hearing normal; no obvious neck masses, no ear or lip lesions, nares normal  CV: RRR. Normal perfusion  Resp: No JVD. Breathing is  non-labored; no audible wheezing. GI: soft and non-distended; no rebound or guarding.  Mild diffuse tenderness, neg murphys  Musculoskeletal: unremarkable with normal function. No embolic signs or cyanosis. Neuro:  Oriented; moves all 4; no focal deficits  Psychiatric: normal affect and mood, no memory impairment    Recent vitals (if inpt):  Patient Vitals for the past 24 hrs:   BP Temp Pulse Resp SpO2   02/27/20 0820 121/78 98.1 °F (36.7 °C) 66 20 96 %   02/27/20 0332 126/78 98 °F (36.7 °C) 83 18 95 %   02/27/20 0001 117/78 97.9 °F (36.6 °C) (!) 105 18 94 %   02/26/20 1957 (!) 141/96 99.3 °F (37.4 °C) (!) 108 20 95 %   02/26/20 1604 134/84 98.2 °F (36.8 °C) 93 18 96 %   02/26/20 1132 142/85 100 °F (37.8 °C) (!) 114 18 93 %   02/26/20 1015 110/73  87 18 97 %   02/26/20 0945 118/73    97 %   02/26/20 0915 (!) 134/92    97 %       Labs:  Recent Labs     02/27/20  0541 02/25/20  2307   WBC 3.7* 11.1   HGB 14.5 16.7    213    137   K 3.6 3.9    101   CO2 27 24   BUN 15 16   CREA 1.22 1.31   GLU 86 114*   TBILI 0.6 1.0   SGOT 35 44*   ALT 55 68*   AP 52 73   LPSE  --  159   TROIQ  --  <0.02*       Lab Results   Component Value Date/Time    WBC 3.7 (L) 02/27/2020 05:41 AM    HGB 14.5 02/27/2020 05:41 AM    PLATELET 659 34/86/7593 05:41 AM    Sodium 136 02/27/2020 05:41 AM    Potassium 3.6 02/27/2020 05:41 AM    Chloride 106 02/27/2020 05:41 AM    CO2 27 02/27/2020 05:41 AM    BUN 15 02/27/2020 05:41 AM    Creatinine 1.22 02/27/2020 05:41 AM    Glucose 86 02/27/2020 05:41 AM    Bilirubin, total 0.6 02/27/2020 05:41 AM    AST (SGOT) 35 02/27/2020 05:41 AM    ALT (SGPT) 55 02/27/2020 05:41 AM    Alk. phosphatase 52 02/27/2020 05:41 AM    Lipase 159 02/25/2020 11:07 PM    Troponin-I, Qt. <0.02 (L) 02/25/2020 11:07 PM       I reviewed recent labs and recent radiologic studies. CTAP 2/26/2020:  IMPRESSION:     1. Moderate fluid volume in the ascending and transverse colon, nonspecific but  may be secondary to underlying enteritis.     2. No evidence of appendicitis, colitis, diverticulitis or bowel obstruction. No  renal calculus or hydronephrosis.     US Abd 2/26/2020:  IMPRESSION:     1. Cholelithiasis without sonographic evidence of acute cholecystitis.     2. Fatty liver. No evidence of biliary ductal dilatation. I independently reviewed radiology images for studies I described above or studies I have ordered. Admission date (for inpatients): 2/25/2020   * No surgery found *  * No surgery found *    ASSESSMENT/PLAN:  Problem List  Never Reviewed          Codes Class Noted    Cholelithiasis ICD-10-CM: K80.20  ICD-9-CM: 574.20  2/26/2020        MDD (major depressive disorder) ICD-10-CM: F32.9  ICD-9-CM: 296.20  2/26/2020        * (Principal) Enteritis ICD-10-CM: K52.9  ICD-9-CM: 558.9  2/26/2020        Transaminitis ICD-10-CM: R74.0  ICD-9-CM: 790.4  2/26/2020            Principal Problem:    Enteritis (2/26/2020)    Active Problems:    Cholelithiasis (2/26/2020)      MDD (major depressive disorder) (2/26/2020)      Transaminitis (2/26/2020)       Plan:  Advance diet per GI recs  IVFs for hydration  Pain/nausea control  Follow labs  Possible ascending/transverse colon enteritis, most likely food poisoning by history and now other cases at base with same timing- Recommend GI consult. Cholelithiasis, no evidence of acute cholecystitis. - No clear evidence that current symptoms are GB related given enteritis  If develops biliary colic then can see for cholecystectomy and possible liver biopsy  LFTs normalized  Pain improved today  Will follow    I have personally performed a face-to-face diagnostic evaluation and management  service on this patient. I have independently seen the patient. I have independently obtained the above history from the patient/family. I have independently examined the patient with above findings. I have independently reviewed data/labs for this patient and developed the above plan of care (MDM).   Signed: Vanesa Lacey MD, FACS

## 2020-02-27 NOTE — PROGRESS NOTES
Patient reports having approximately 5 watery stools today. Moderate in amount. Continue to monitor.

## 2020-02-27 NOTE — PROGRESS NOTES
Problem: Falls - Risk of  Goal: *Absence of Falls  Description  Document Collin Hamm Fall Risk and appropriate interventions in the flowsheet.   Outcome: Progressing Towards Goal  Note: Fall Risk Interventions:            Medication Interventions: Teach patient to arise slowly                   Problem: Pain  Goal: *Control of Pain  Outcome: Progressing Towards Goal     Problem: Nausea/Vomiting (Adult)  Goal: *Absence of nausea/vomiting  Outcome: Progressing Towards Goal

## 2020-02-27 NOTE — PROGRESS NOTES
PM assessment complete, see doc flowsheet. Denies needs or pain at this time. Aware to call should needs arise. Will continue to monitor.

## 2020-02-28 VITALS
WEIGHT: 212 LBS | SYSTOLIC BLOOD PRESSURE: 132 MMHG | HEART RATE: 89 BPM | HEIGHT: 70 IN | OXYGEN SATURATION: 95 % | RESPIRATION RATE: 18 BRPM | TEMPERATURE: 98.1 F | DIASTOLIC BLOOD PRESSURE: 96 MMHG | BODY MASS INDEX: 30.35 KG/M2

## 2020-02-28 LAB
ALBUMIN SERPL-MCNC: 4 G/DL (ref 3.5–5)
ALBUMIN/GLOB SERPL: 1 {RATIO} (ref 1.2–3.5)
ALP SERPL-CCNC: 54 U/L (ref 50–136)
ALT SERPL-CCNC: 51 U/L (ref 12–65)
ANION GAP SERPL CALC-SCNC: 6 MMOL/L (ref 7–16)
AST SERPL-CCNC: 35 U/L (ref 15–37)
BASOPHILS # BLD: 0 K/UL (ref 0–0.2)
BASOPHILS NFR BLD: 0 % (ref 0–2)
BILIRUB SERPL-MCNC: 0.6 MG/DL (ref 0.2–1.1)
BUN SERPL-MCNC: 16 MG/DL (ref 6–23)
CALCIUM SERPL-MCNC: 9.2 MG/DL (ref 8.3–10.4)
CHLORIDE SERPL-SCNC: 104 MMOL/L (ref 98–107)
CO2 SERPL-SCNC: 27 MMOL/L (ref 21–32)
CREAT SERPL-MCNC: 1.37 MG/DL (ref 0.8–1.5)
DIFFERENTIAL METHOD BLD: ABNORMAL
EOSINOPHIL # BLD: 0.1 K/UL (ref 0–0.8)
EOSINOPHIL NFR BLD: 2 % (ref 0.5–7.8)
ERYTHROCYTE [DISTWIDTH] IN BLOOD BY AUTOMATED COUNT: 13.4 % (ref 11.9–14.6)
GLOBULIN SER CALC-MCNC: 4.2 G/DL (ref 2.3–3.5)
GLUCOSE SERPL-MCNC: 84 MG/DL (ref 65–100)
HCT VFR BLD AUTO: 45.8 % (ref 41.1–50.3)
HGB BLD-MCNC: 15.4 G/DL (ref 13.6–17.2)
IMM GRANULOCYTES # BLD AUTO: 0 K/UL (ref 0–0.5)
IMM GRANULOCYTES NFR BLD AUTO: 0 % (ref 0–5)
LYMPHOCYTES # BLD: 1.5 K/UL (ref 0.5–4.6)
LYMPHOCYTES NFR BLD: 34 % (ref 13–44)
MCH RBC QN AUTO: 28.9 PG (ref 26.1–32.9)
MCHC RBC AUTO-ENTMCNC: 33.6 G/DL (ref 31.4–35)
MCV RBC AUTO: 86.1 FL (ref 79.6–97.8)
MONOCYTES # BLD: 0.7 K/UL (ref 0.1–1.3)
MONOCYTES NFR BLD: 15 % (ref 4–12)
NEUTS SEG # BLD: 2.2 K/UL (ref 1.7–8.2)
NEUTS SEG NFR BLD: 48 % (ref 43–78)
NRBC # BLD: 0 K/UL (ref 0–0.2)
PLATELET # BLD AUTO: 207 K/UL (ref 150–450)
PMV BLD AUTO: 10.9 FL (ref 9.4–12.3)
POTASSIUM SERPL-SCNC: 3.5 MMOL/L (ref 3.5–5.1)
PROT SERPL-MCNC: 8.2 G/DL (ref 6.3–8.2)
RBC # BLD AUTO: 5.32 M/UL (ref 4.23–5.6)
SODIUM SERPL-SCNC: 137 MMOL/L (ref 136–145)
WBC # BLD AUTO: 4.5 K/UL (ref 4.3–11.1)

## 2020-02-28 PROCEDURE — 36415 COLL VENOUS BLD VENIPUNCTURE: CPT

## 2020-02-28 PROCEDURE — 74011250637 HC RX REV CODE- 250/637: Performed by: PHYSICIAN ASSISTANT

## 2020-02-28 PROCEDURE — 74011250637 HC RX REV CODE- 250/637: Performed by: INTERNAL MEDICINE

## 2020-02-28 PROCEDURE — 85025 COMPLETE CBC W/AUTO DIFF WBC: CPT

## 2020-02-28 PROCEDURE — 80053 COMPREHEN METABOLIC PANEL: CPT

## 2020-02-28 RX ADMIN — CIPROFLOXACIN HYDROCHLORIDE 500 MG: 500 TABLET, FILM COATED ORAL at 09:23

## 2020-02-28 RX ADMIN — Medication 10 ML: at 05:47

## 2020-02-28 RX ADMIN — FLUOXETINE 20 MG: 20 CAPSULE ORAL at 09:23

## 2020-02-28 RX ADMIN — METRONIDAZOLE 500 MG: 500 TABLET ORAL at 05:44

## 2020-02-28 NOTE — PROGRESS NOTES
[address]  Sophia Monroy, [address] [facility]  [phone]    H&P/Consult Note/Progress Note:   Patrick Delgado  MRN: [id number]  :1980  Age:39 y.o.    HPI: Patrick Delgado is a 44 y.o. male who we are asked by the ED to see for cholelithiasis. Pt reports mild RUQ pain that began after eating a hamburger at lunch yesterday. He had a usual hamburger at [facility] and felt fine for 5 1/2 hours. He tried to burp and vomit without success. He thought he was having GERD, but central abdominal pain became worse. His pain worsened throughout the night and became diffuse abdominal pain. He reports nausea but denies vomiting, fever, or diarrhea. He denies jaundice or change in color of urine. His abdominal pain is controlled with pain medication in the ED. He also c/o low back pain, which is chronic. He denies h/o abdominal surgeries. Pt was incontinent of stool in ED. He has now had many episodes of watery diarrhea that he is blaming on IVFs. He also found out that 2 other [employer] personnel that he ate with have same symptoms and are being diagnosed as having food poisoning. WBC in ED 11, Tbili 1, ALT 68, AST 44, Alk Phos WNL. He does drink alcohol socially, but has not had a drink in 2 weeks. Unknown history of gallstones or abnormal LFTs. CT abdomen demonstrates enteritis. RUQ US demonstrates gallstone, no evidence of acute cholecystitis. General surgery was consulted for cholecystectomy. 20: Awake in bed, reports improved pain. Denies any n/v overnight. Tolerated CLD. GI following. LFTs WNL. WBC 3.7  20: Awake in bed, reports improved pain. Denies any n/v overnight. Tolerated FLD and thinks improving time between loose stools. GI signing off.   Labs normalized except rising Cr as he did not take much po hydration yesterday and refused IVF      Past Medical History:   Diagnosis Date    Back pain  Depression     Psychiatric disorder      History reviewed. No pertinent surgical history. Current Facility-Administered Medications   Medication Dose Route Frequency    sodium chloride (NS) flush 5-40 mL  5-40 mL IntraVENous Q8H    sodium chloride (NS) flush 5-40 mL  5-40 mL IntraVENous PRN    ondansetron (ZOFRAN) injection 4 mg  4 mg IntraVENous Q4H PRN    FLUoxetine (PROzac) capsule 20 mg  20 mg Oral DAILY    oxyCODONE IR (ROXICODONE) tablet 5 mg  5 mg Oral Q6H PRN    HYDROmorphone (DILAUDID) tablet 1 mg  1 mg Oral Q6H PRN    ciprofloxacin HCl (CIPRO) tablet 500 mg  500 mg Oral Q12H    metroNIDAZOLE (FLAGYL) tablet 500 mg  500 mg Oral Q12H     Flexeril [cyclobenzaprine]  Social History     Socioeconomic History    Marital status: SINGLE     Spouse name: Not on file    Number of children: Not on file    Years of education: Not on file    Highest education level: Not on file   Tobacco Use    Smoking status: Never Smoker    Smokeless tobacco: Never Used   Substance and Sexual Activity    Alcohol use: Yes     Comment: occassionally    Drug use: Not Currently    Sexual activity: Yes     Partners: Female     Social History     Tobacco Use   Smoking Status Never Smoker   Smokeless Tobacco Never Used     History reviewed. No pertinent family history. ROS: The patient has no difficulty with chest pain or shortness of breath. No fever or chills. Comprehensive review of systems was otherwise unremarkable except as noted above. Physical Exam:   Visit Vitals  BP (!) 132/96 (BP 1 Location: Left arm, BP Patient Position: At rest;Head of bed elevated (Comment degrees)) Comment: RN Daryle Bienenstock notified   Pulse 89   Temp 98.1 °F (36.7 °C)   Resp 18   Ht 5' 10\" (1.778 m)   Wt 212 lb (96.2 kg)   SpO2 95%   BMI 30.42 kg/m²     Constitutional: Alert, oriented, cooperative patient in no acute distress; appears stated age    Eyes:Sclera are clear.  EOMs intact  ENMT: no external lesions gross hearing normal; no obvious neck masses, no ear or lip lesions, nares normal  CV: RRR. Normal perfusion  Resp: No JVD. Breathing is  non-labored; no audible wheezing. GI: soft and non-distended; no rebound or guarding. Mild diffuse tenderness, neg murphys  Musculoskeletal: unremarkable with normal function. No embolic signs or cyanosis. Neuro:  Oriented; moves all 4; no focal deficits  Psychiatric: normal affect and mood, no memory impairment    Recent vitals (if inpt):  Patient Vitals for the past 24 hrs:   BP Temp Pulse Resp SpO2   02/28/20 1158 (!) 132/96 98.1 °F (36.7 °C) 89 18 95 %   02/28/20 0747 129/89 98.4 °F (36.9 °C) 94 16 97 %   02/28/20 0308 110/76 98.5 °F (36.9 °C) 93 18 97 %   02/28/20 0019 (!) 136/97 97.4 °F (36.3 °C) 90 20 94 %   02/27/20 1915 125/90 98.9 °F (37.2 °C) 85 18 95 %       Labs:  Recent Labs     02/28/20  0615  02/25/20  2307   WBC 4.5   < > 11.1   HGB 15.4   < > 16.7      < > 213      < > 137   K 3.5   < > 3.9      < > 101   CO2 27   < > 24   BUN 16   < > 16   CREA 1.37   < > 1.31   GLU 84   < > 114*   TBILI 0.6   < > 1.0   SGOT 35   < > 44*   ALT 51   < > 68*   AP 54   < > 73   LPSE  --   --  159   TROIQ  --   --  <0.02*    < > = values in this interval not displayed. Lab Results   Component Value Date/Time    WBC 4.5 02/28/2020 06:15 AM    HGB 15.4 02/28/2020 06:15 AM    PLATELET 408 40/70/5065 06:15 AM    Sodium 137 02/28/2020 06:15 AM    Potassium 3.5 02/28/2020 06:15 AM    Chloride 104 02/28/2020 06:15 AM    CO2 27 02/28/2020 06:15 AM    BUN 16 02/28/2020 06:15 AM    Creatinine 1.37 02/28/2020 06:15 AM    Glucose 84 02/28/2020 06:15 AM    Bilirubin, total 0.6 02/28/2020 06:15 AM    AST (SGOT) 35 02/28/2020 06:15 AM    ALT (SGPT) 51 02/28/2020 06:15 AM    Alk. phosphatase 54 02/28/2020 06:15 AM    Lipase 159 02/25/2020 11:07 PM    Troponin-I, Qt. <0.02 (L) 02/25/2020 11:07 PM       I reviewed recent labs and recent radiologic studies. CTAP 2/26/2020:  IMPRESSION:     1. Moderate fluid volume in the ascending and transverse colon, nonspecific but  may be secondary to underlying enteritis.     2. No evidence of appendicitis, colitis, diverticulitis or bowel obstruction. No  renal calculus or hydronephrosis. US Abd 2/26/2020:  IMPRESSION:     1. Cholelithiasis without sonographic evidence of acute cholecystitis.     2. Fatty liver. No evidence of biliary ductal dilatation. I independently reviewed radiology images for studies I described above or studies I have ordered. Admission date (for inpatients): 2/25/2020   * No surgery found *  * No surgery found *    ASSESSMENT/PLAN:  Problem List  Never Reviewed          Codes Class Noted    Cholelithiasis ICD-10-CM: K80.20  ICD-9-CM: 574.20  2/26/2020        MDD (major depressive disorder) ICD-10-CM: F32.9  ICD-9-CM: 296.20  2/26/2020        * (Principal) Enteritis ICD-10-CM: K52.9  ICD-9-CM: 558.9  2/26/2020        Transaminitis ICD-10-CM: R74.0  ICD-9-CM: 790.4  2/26/2020            Principal Problem:    Enteritis (2/26/2020)    Active Problems:    Cholelithiasis (2/26/2020)      MDD (major depressive disorder) (2/26/2020)      Transaminitis (2/26/2020)       Plan:  Advance diet per GI recs. More formed food improving symptoms  Taking better po fluids for hydration  Pain/nausea control  Follow labs - nomalized  C.diff and stool cultures negative, O&P pending  GI signing off  Cholelithiasis, no evidence of acute cholecystitis. - No clear evidence that current symptoms are GB related given enteritis  If develops biliary colic then can see for cholecystectomy and possible liver biopsy  He feels ready for d/c    I have personally performed a face-to-face diagnostic evaluation and management  service on this patient. I have independently seen the patient. I have independently obtained the above history from the patient/family. I have independently examined the patient with above findings.   I have independently reviewed data/labs for this patient and developed the above plan of care (MDM).   Signed: Sherly Marin MD, FACS

## 2020-02-28 NOTE — PROGRESS NOTES
Problem: Falls - Risk of  Goal: *Absence of Falls  Description  Document Dulce Martinez Fall Risk and appropriate interventions in the flowsheet.   Outcome: Progressing Towards Goal  Note: Fall Risk Interventions:            Medication Interventions: Teach patient to arise slowly                   Problem: Pain  Goal: *Control of Pain  Outcome: Progressing Towards Goal     Problem: Nausea/Vomiting (Adult)  Goal: *Absence of nausea/vomiting  Outcome: Progressing Towards Goal

## 2020-02-28 NOTE — PROGRESS NOTES
[facility] Progress Note         Admit Date:  2/25/2020  Today's Date:  2/28/2020    CC:  Enteritis    Subjective:     Patient feels great and he is ready to go home today. He is tolerating his oral diet. He wants to eat more solid foods which he thinks is actually helping have less loose stools. He reports a total of 6 BMs daily. Medications:   Current Facility-Administered Medications   Medication Dose Route Frequency    sodium chloride (NS) flush 5-40 mL  5-40 mL IntraVENous Q8H    sodium chloride (NS) flush 5-40 mL  5-40 mL IntraVENous PRN    ondansetron (ZOFRAN) injection 4 mg  4 mg IntraVENous Q4H PRN    FLUoxetine (PROzac) capsule 20 mg  20 mg Oral DAILY    oxyCODONE IR (ROXICODONE) tablet 5 mg  5 mg Oral Q6H PRN    HYDROmorphone (DILAUDID) tablet 1 mg  1 mg Oral Q6H PRN    ciprofloxacin HCl (CIPRO) tablet 500 mg  500 mg Oral Q12H    metroNIDAZOLE (FLAGYL) tablet 500 mg  500 mg Oral Q12H       Review of Systems:  ROS was obtained, with pertinent positives as listed above. No chest pain or SOB. Diet:  Full liquid    Objective:   Vitals:  Visit Vitals  /89   Pulse 94   Temp 98.4 °F (36.9 °C)   Resp 16   Ht 5' 10\" (1.778 m)   Wt 96.2 kg (212 lb)   SpO2 97%   BMI 30.42 kg/m²     Intake/Output:  No intake/output data recorded. No intake/output data recorded. Exam:  Gen:  Pt is alert, cooperative, no acute distress  Cardiovascular: Regular rate and rhythm. No murmurs, gallops, or rubs. Respiratory:   Clear breath sounds anteriorly with no wheezes, rales, or rhonchi. GI:  Abdomen nondistended, soft, and NT. No G/R. Normal active bowel sounds. No enlargement of the liver or spleen. No masses palpable. Musculoskeletal:  No pitting edema of the lower legs. Neurological:  Gross memory appears intact. Patient is alert and oriented. Psychiatric:  Mood appears appropriate with judgement intact.       Data Review (Labs):    Recent Labs     02/28/20  0615 02/27/20  0541 02/25/20  2307   WBC 4.5 3.7* 11.1   HGB 15.4 14.5 16.7   HCT 45.8 43.9 48.8    178 213   MCV 86.1 86.9 84.4    136 137   K 3.5 3.6 3.9    106 101   CO2 27 27 24   BUN 16 15 16   CREA 1.37 1.22 1.31   CA 9.2 8.8 10.3   GLU 84 86 114*   AP 54 52 73   SGOT 35 35 44*   ALT 51 55 68*   TBILI 0.6 0.6 1.0   ALB 4.0 3.6 4.7   TP 8.2 7.5 8.9*   LPSE  --   --  159     CTAP 2/26/2020:  IMPRESSION:     1. Moderate fluid volume in the ascending and transverse colon, nonspecific but  may be secondary to underlying enteritis.     2. No evidence of appendicitis, colitis, diverticulitis or bowel obstruction. No  renal calculus or hydronephrosis.     US Abd 2/26/2020:  IMPRESSION:     1. Cholelithiasis without sonographic evidence of acute cholecystitis.     2. Fatty liver. No evidence of biliary ductal dilatation. Assessment:     Principal Problem:  Enteritis (2/26/2020)    Active Problems:  Cholelithiasis (2/26/2020)  MDD (major depressive disorder) (2/26/2020)  Transaminitis (2/26/2020) - resolved    Generally healthy 45 y/o M who presents with acute onset of RUQ abdominal pain after eating a hamburger yesterday. Nausea and diarrhea since ED visit/admission, resolved today. He has 2 friends who ate lunch with him also having similar symptoms. Infectious enteritis suspected, less likely IBD, clinically improved at this point. C diff negative.      Surgery on board, \"No clear evidence that current symptoms are GB related. \" Cholelithiasis, no evidence of acute cholecystitis.       Noted elevated transaminases on presentation, normalized. Perhaps from dehydration. Fatty liver on US. Viral hepatitis negative. Plan:     Enteritis, presumed infectious. Stool cx and Cdiff both negative. O&P in process. Clinically slowly improving. Continue supportive care. Gallstones, asymptomatic. Explained to him about biliary colic symptoms should he develop those in the future. Will sign off.   We will plan on seeing him back in clinic in 1-2 months or sooner if needed.       Annalee Phillips MD covering for Dr. Kely Valencia  [facility]

## 2020-02-28 NOTE — PROGRESS NOTES
Hospitalist Progress Note     Admit Date:  2020 10:21 PM   Name:  Lazarus Abraham   Age:  44 y.o.  :  1980   MRN:  [id number]   PCP:  Christiano Trujillo MD  Treatment Team: Attending Provider: Zak Laureano DO; Consulting Provider: YAW Cordova; Consulting Provider: Kevyn Napoles MD; : Sharmin Easton Consulting Provider: Clifton Killian MD    Subjective: The patient is a 58-year-old -American gentleman with chronic back pain, depression, is presently managed for enteritis likely due to food poisoning and cholelithiasis. He was seen by surgery and GI. As per surgery, his symptoms do not appear to be related to his cholelithiasis, as the patient mostly presented with diarrhea. Patient also reports some vague abdominal pain, mostly periumbilical.  He has been empirically started on ciprofloxacin and metronidazole. Stool work-up was obtained and is in progress. Objective:     Patient Vitals for the past 24 hrs:   Temp Pulse Resp BP SpO2   20 191 98.9 °F (37.2 °C) 85 18 125/90 95 %   20 1221 98.1 °F (36.7 °C) 83 18 127/87 94 %   20 0820 98.1 °F (36.7 °C) 66 20 121/78 96 %   20 0332 98 °F (36.7 °C) 83 18 126/78 95 %   20 0001 97.9 °F (36.6 °C) (!) 105 18 117/78 94 %     Oxygen Therapy  O2 Sat (%): 95 % (20 191)  Pulse via Oximetry: 87 beats per minute (20 1015)  O2 Device: Nasal cannula (20 1015)  ETCO2 (mmHg): 1.5 mmHg (20 1015)  No intake or output data in the 24 hours ending 20      General:    Well nourished. Alert. CV:   RRR. No murmur, rub, or gallop. Lungs:   Clear to auscultation bilaterally. No wheezing, rhonchi, or rales. Abdomen:   Soft, minimally tender on palpation of the periumbilical area, nondistended. Extremities: Warm and dry. No cyanosis or edema. Skin:     No rashes or jaundice.      Data Review:  I have reviewed all labs, meds, telemetry events, and studies from the last 24 hours. Recent Results (from the past 24 hour(s))   DRUG SCREEN PSYCH, URINE    Collection Time: 02/27/20  4:39 AM   Result Value Ref Range    BENZODIAZEPINES NEGATIVE       OPIATES POSITIVE      AMPHETAMINES NEGATIVE       COCAINE NEGATIVE       THC (TH-CANNABINOL) NEGATIVE      METABOLIC PANEL, COMPREHENSIVE    Collection Time: 02/27/20  5:41 AM   Result Value Ref Range    Sodium 136 136 - 145 mmol/L    Potassium 3.6 3.5 - 5.1 mmol/L    Chloride 106 98 - 107 mmol/L    CO2 27 21 - 32 mmol/L    Anion gap 3 (L) 7 - 16 mmol/L    Glucose 86 65 - 100 mg/dL    BUN 15 6 - 23 MG/DL    Creatinine 1.22 0.8 - 1.5 MG/DL    GFR est AA >60 >60 ml/min/1.73m2    GFR est non-AA >60 >60 ml/min/1.73m2    Calcium 8.8 8.3 - 10.4 MG/DL    Bilirubin, total 0.6 0.2 - 1.1 MG/DL    ALT (SGPT) 55 12 - 65 U/L    AST (SGOT) 35 15 - 37 U/L    Alk. phosphatase 52 50 - 136 U/L    Protein, total 7.5 6.3 - 8.2 g/dL    Albumin 3.6 3.5 - 5.0 g/dL    Globulin 3.9 (H) 2.3 - 3.5 g/dL    A-G Ratio 0.9 (L) 1.2 - 3.5     CBC WITH AUTOMATED DIFF    Collection Time: 02/27/20  5:41 AM   Result Value Ref Range    WBC 3.7 (L) 4.3 - 11.1 K/uL    RBC 5.05 4.23 - 5.6 M/uL    HGB 14.5 13.6 - 17.2 g/dL    HCT 43.9 41.1 - 50.3 %    MCV 86.9 79.6 - 97.8 FL    MCH 28.7 26.1 - 32.9 PG    MCHC 33.0 31.4 - 35.0 g/dL    RDW 13.4 11.9 - 14.6 %    PLATELET 892 759 - 266 K/uL    MPV 10.7 9.4 - 12.3 FL    ABSOLUTE NRBC 0.00 0.0 - 0.2 K/uL    DF AUTOMATED      NEUTROPHILS 63 43 - 78 %    LYMPHOCYTES 24 13 - 44 %    MONOCYTES 11 4.0 - 12.0 %    EOSINOPHILS 2 0.5 - 7.8 %    BASOPHILS 0 0.0 - 2.0 %    IMMATURE GRANULOCYTES 0 0.0 - 5.0 %    ABS. NEUTROPHILS 2.3 1.7 - 8.2 K/UL    ABS. LYMPHOCYTES 0.9 0.5 - 4.6 K/UL    ABS. MONOCYTES 0.4 0.1 - 1.3 K/UL    ABS. EOSINOPHILS 0.1 0.0 - 0.8 K/UL    ABS. BASOPHILS 0.0 0.0 - 0.2 K/UL    ABS. IMM.  GRANS. 0.0 0.0 - 0.5 K/UL        All Micro Results     Procedure Component Value Units Date/Time    C. DIFFICILE AG & TOXIN A/B [[id number]] Collected:  02/26/20 2005    Order Status:  Completed Specimen:  Stool Updated:  02/27/20 1145     [address] ANTIGEN       C. DIFFICILE GDH ANTIGEN-NEGATIVE           C. difficile toxin       C. DIFFICILE TOXIN-NEGATIVE           PCR Reflex NOT APPLICABLE        INTERPRETATION       NEGATIVE FOR TOXIGENIC C. DIFFICILE           Clinical Consideration       NEGATIVE FOR TOXIGENIC C. DIFFICILE          CULTURE, STOOL [[id number]] Collected:  02/26/20 2005    Order Status:  Completed Specimen:  Stool Updated:  02/27/20 0854    OVA & PARASITES, STOOL [[id number]] Collected:  02/26/20 2005    Order Status:  Completed Specimen:  Feces from Stool Updated:  02/26/20 2019    STOOL CULTURE FOR [place] [[id number]] Collected:  02/26/20 2005    Order Status:  Completed Specimen:  Stool Updated:  02/26/20 2016          Current Meds:  Current Facility-Administered Medications   Medication Dose Route Frequency    sodium chloride (NS) flush 5-40 mL  5-40 mL IntraVENous Q8H    sodium chloride (NS) flush 5-40 mL  5-40 mL IntraVENous PRN    ondansetron (ZOFRAN) injection 4 mg  4 mg IntraVENous Q4H PRN    FLUoxetine (PROzac) capsule 20 mg  20 mg Oral DAILY    oxyCODONE IR (ROXICODONE) tablet 5 mg  5 mg Oral Q6H PRN    HYDROmorphone (DILAUDID) tablet 1 mg  1 mg Oral Q6H PRN    ciprofloxacin HCl (CIPRO) tablet 500 mg  500 mg Oral Q12H    metroNIDAZOLE (FLAGYL) tablet 500 mg  500 mg Oral Q12H       Other Studies (last 24 hours):  No results found.     Assessment and Plan:     Hospital Problems as of 2/27/2020 Never Reviewed          Codes Class Noted - Resolved POA    Cholelithiasis ICD-10-CM: K80.20  ICD-9-CM: 574.20  2/26/2020 - Present Unknown        MDD (major depressive disorder) ICD-10-CM: F32.9  ICD-9-CM: 296.20  2/26/2020 - Present Unknown        * (Principal) Enteritis ICD-10-CM: K52.9  ICD-9-CM: 558.9  2/26/2020 - Present Unknown        Transaminitis ICD-10-CM: R74.0  ICD-9-CM: 790.4  2/26/2020 - Present Unknown 1 - enteritis likely in the context of food poisoning   2 - cholelithiasis   3 - chronic back pain   4 - depression    PLAN:    · Continue empiric antibiotics, ciprofloxacin and metronidazole  · Stool work-up is in progress   · Diet has been advanced to full liquid   · GI consultation appreciated   · Surgery consultation also appreciated. No indication for cholecystectomy at this time. · Resume home medications for chronic conditions. DC planning/Dispo: Home in 24 to 48 hours  DVT ppx: With SCDs    Signed:   Keily Conn MD

## 2020-02-28 NOTE — DISCHARGE SUMMARY
Hospitalist Discharge Summary     Admit Date:  2020 10:21 PM   Name:  Cristin Person   Age:  44 y.o.  :  1980   MRN:  [id number]   PCP:  Wally Richardson MD  Treatment Team: Attending Provider: Vanessa Hampton DO; Consulting Provider: YAW Perez; Consulting Provider: Srinivas Crooks MD; : Jazmine Coelho    Problem List for this Hospitalization:  Hospital Problems as of 2020 Never Reviewed          Codes Class Noted - Resolved POA    Cholelithiasis ICD-10-CM: K80.20  ICD-9-CM: 574.20  2020 - Present Unknown        MDD (major depressive disorder) ICD-10-CM: F32.9  ICD-9-CM: 296.20  2020 - Present Unknown        * (Principal) Enteritis ICD-10-CM: K52.9  ICD-9-CM: 558.9  2020 - Present Unknown        Transaminitis ICD-10-CM: R74.0  ICD-9-CM: 790.4  2020 - Present Unknown                  Hospital Course: The patient is a 68-year-old -American gentleman with chronic back pain, depression, was managed for enteritis likely due to food poisoning and cholelithiasis. He was seen by Surgery and GI. As per surgery, his symptoms do not appear to be related to his cholelithiasis, as the patient mostly presented with diarrhea. Patient also reports some vague abdominal pain, mostly periumbilical.  He has been empirically started on ciprofloxacin and metronidazole. Stool work-up was obtained and is basically negative, as his C diff and stool cultures were both negative. His diet was gradually advanced and the patient was able to tolerate it well. He felt well enough to go home today, even if he is still reporting some diarrhea. As he is clinically and hemodynamically stable, he is being discharged home. Since his stool w/u was essentially negative, he was not given antibiotics at discharge. Follow up instructions below. Plan was discussed with the patient and the nursing staff. All questions answered.   Patient was stable at time of discharge and was instructed to call or return if there are any concerns or recurrence of symptoms. Diagnostic Imaging/Tests:   Ct Abd Pelv W Cont    Result Date: 2/26/2020  CT abdomen and pelvis with contrast COMPARISON: None. INDICATION: Abdominal pain, worse in the right side. Check for appendicitis, kidney stone of cholecystitis. TECHNIQUE: CT imaging was performed of the abdomen and pelvis following the uncomplicated administration of intravenous contrast (Isovue 370, 100 mL). Oral contrast was administered. Radiation dose reduction techniques were used for this study:  Our CT scanners use one or all of the following: Automated exposure control, adjustment of the mA and/or kVp according to patient's size, iterative reconstruction. FINDINGS: Visualized lung bases are unremarkable. Heart is mildly enlarged. Abdomen findings: There is suggestion of fatty infiltration of the liver. The gallbladder, pancreas, spleen, adrenal glands, and abdominal aorta and the kidneys are unremarkable. No hydronephrosis. Stomach is normal in contour. Small bowel loops are normal in caliber. No small bowel obstruction. No evidence of lymphadenopathy. Pelvic findings: There is moderate fluid volume in the right colon and the transverse colon. Colon is normal in course and caliber. Appendix is within normal limits. Urinary bladder is normal in contour. There is no free air or free fluid. Surrounding bones are intact. IMPRESSION: 1. Moderate fluid volume in the ascending and transverse colon, nonspecific but may be secondary to underlying enteritis. 2. No evidence of appendicitis, colitis, diverticulitis or bowel obstruction. No renal calculus or hydronephrosis. [facility]    Result Date: 2/26/2020  Limited ultrasound abdomen INDICATION: Right-sided abdominal pain. Evaluate gallbladder COMPARISON: CT yesterday TECHNIQUE: Sonographic grayscale imaging of the abdomen was performed. FINDINGS: There is fatty infiltration of the liver.  Liver is normal in contour and size and measures 14.3 cm. There is cholelithiasis. No gallbladder wall thickening, or pericholecystic fluid. Sonographer reports negative sonographic Reynoso's sign, although patient had received pain medication prior to the exam. No intra or extrahepatic biliary ductal dilatation. Common bile duct measures 3 mm. Right kidney is unremarkable without hydronephrosis and measures 9.1 cm. Pancreas and abdominal aorta are not well seen due to overlying bowel gas. No evidence of ascites. IMPRESSION: 1. Cholelithiasis without sonographic evidence of acute cholecystitis. 2. Fatty liver. No evidence of biliary ductal dilatation. Xr Chest Port    Result Date: 2/26/2020  Portable chest xray  COMPARISON: None CLINICAL HISTORY: Tachypnea. FINDINGS: Lungs are underinflated. Increased interstitial prominence, likely due to vascular crowding. No focal consolidation, pneumothorax or pleural effusion. Heart is enlarged. Mediastinal contour is within normal limits. Surrounding bones are unremarkable. IMPRESSION: 1. Underinflated lungs. No evidence of acute pulmonary disease. Echocardiogram results:  No results found for this visit on 02/25/20.       All Micro Results     Procedure Component Value Units Date/Time    CULTURE, STOOL [[id number]] Collected:  02/26/20 2005    Order Status:  Completed Specimen:  Stool Updated:  02/28/20 0836     Special Requests: NO SPECIAL REQUESTS        Culture result:       NO GROWTH OF SALMONELLA OR SHIGELLA IS EVIDENT ON FIRST READING, FINAL REPORT TO FOLLOW AFTER FURTHER INCUBATION OF CULTURE          C. DIFFICILE AG & TOXIN A/B [[id number]] Collected:  02/26/20 2005    Order Status:  Completed Specimen:  Stool Updated:  02/27/20 1145     [address] ANTIGEN       C. DIFFICILE GDH ANTIGEN-NEGATIVE           C. difficile toxin       C. DIFFICILE TOXIN-NEGATIVE           PCR Reflex NOT APPLICABLE        INTERPRETATION       NEGATIVE FOR TOXIGENIC C. DIFFICILE Clinical Consideration       NEGATIVE FOR TOXIGENIC C. DIFFICILE          OVA & PARASITES, STOOL [[id number]] Collected:  02/26/20 2005    Order Status:  Completed Specimen:  Feces from Stool Updated:  02/26/20 2019    STOOL CULTURE FOR [address]onna [address]ell [[id number]] Collected:  02/26/20 2005    Order Status:  Completed Specimen:  Stool Updated:  02/26/20 2016          Labs: Results:       BMP, Mg, Phos Recent Labs     02/28/20 0615 02/27/20  0541 02/25/20  2307    136 137   K 3.5 3.6 3.9    106 101   CO2 27 27 24   AGAP 6* 3* 12   BUN 16 15 16   CREA 1.37 1.22 1.31   CA 9.2 8.8 10.3   GLU 84 86 114*      CBC Recent Labs     02/28/20 0615 02/27/20  0541 02/25/20 2307   WBC 4.5 3.7* 11.1   RBC 5.32 5.05 5.78*   HGB 15.4 14.5 16.7   HCT 45.8 43.9 48.8    178 213   GRANS 48 63 85*   LYMPH 34 24 9*   EOS 2 2 0*   MONOS 15* 11 5   BASOS 0 0 0   IG 0 0 0   ANEU 2.2 2.3 9.4*   ABL 1.5 0.9 1.0   RUPAL 0.1 0.1 0.0   ABM 0.7 0.4 0.6   ABB 0.0 0.0 0.0   AIG 0.0 0.0 0.0      LFT Recent Labs     02/28/20 0615 02/27/20  0541 02/25/20  2307   SGOT 35 35 44*   ALT 51 55 68*   AP 54 52 73   TP 8.2 7.5 8.9*   ALB 4.0 3.6 4.7   GLOB 4.2* 3.9* 4.2*   AGRAT 1.0* 0.9* 1.1*      Cardiac Testing Lab Results   Component Value Date/Time    Troponin-I, Qt. <0.02 (L) 02/25/2020 11:07 PM      Coagulation Tests No results found for: PTP, INR, APTT, INREXT   A1c No results found for: HBA1C, HGBE8, CSW2THYB   Lipid Panel No results found for: CHOL, CHOLPOCT, CHOLX, CHLST, CHOLV, 822373, HDL, HDLP, LDL, LDLC, DLDLP, 111718, VLDLC, VLDL, TGLX, TRIGL, TRIGP, TGLPOCT, CHHD, CHHDX   Thyroid Panel No results found for: T4, T3U, TSH, TSHEXT     Most Recent UA No results found for: COLOR, APPRN, REFSG, JANNA, PROTU, GLUCU, KETU, BILU, BLDU, UROU, JOSE, LEUKU     Allergies   Allergen Reactions    Flexeril [Cyclobenzaprine] Rash       There is no immunization history on file for this patient.     All Labs from Last 24 Hrs:  Recent Results (from the past 24 hour(s))   METABOLIC PANEL, COMPREHENSIVE    Collection Time: 02/28/20  6:15 AM   Result Value Ref Range    Sodium 137 136 - 145 mmol/L    Potassium 3.5 3.5 - 5.1 mmol/L    Chloride 104 98 - 107 mmol/L    CO2 27 21 - 32 mmol/L    Anion gap 6 (L) 7 - 16 mmol/L    Glucose 84 65 - 100 mg/dL    BUN 16 6 - 23 MG/DL    Creatinine 1.37 0.8 - 1.5 MG/DL    GFR est AA >60 >60 ml/min/1.73m2    GFR est non-AA >60 >60 ml/min/1.73m2    Calcium 9.2 8.3 - 10.4 MG/DL    Bilirubin, total 0.6 0.2 - 1.1 MG/DL    ALT (SGPT) 51 12 - 65 U/L    AST (SGOT) 35 15 - 37 U/L    Alk. phosphatase 54 50 - 136 U/L    Protein, total 8.2 6.3 - 8.2 g/dL    Albumin 4.0 3.5 - 5.0 g/dL    Globulin 4.2 (H) 2.3 - 3.5 g/dL    A-G Ratio 1.0 (L) 1.2 - 3.5     CBC WITH AUTOMATED DIFF    Collection Time: 02/28/20  6:15 AM   Result Value Ref Range    WBC 4.5 4.3 - 11.1 K/uL    RBC 5.32 4.23 - 5.6 M/uL    HGB 15.4 13.6 - 17.2 g/dL    HCT 45.8 41.1 - 50.3 %    MCV 86.1 79.6 - 97.8 FL    MCH 28.9 26.1 - 32.9 PG    MCHC 33.6 31.4 - 35.0 g/dL    RDW 13.4 11.9 - 14.6 %    PLATELET 301 522 - 002 K/uL    MPV 10.9 9.4 - 12.3 FL    ABSOLUTE NRBC 0.00 0.0 - 0.2 K/uL    DF AUTOMATED      NEUTROPHILS 48 43 - 78 %    LYMPHOCYTES 34 13 - 44 %    MONOCYTES 15 (H) 4.0 - 12.0 %    EOSINOPHILS 2 0.5 - 7.8 %    BASOPHILS 0 0.0 - 2.0 %    IMMATURE GRANULOCYTES 0 0.0 - 5.0 %    ABS. NEUTROPHILS 2.2 1.7 - 8.2 K/UL    ABS. LYMPHOCYTES 1.5 0.5 - 4.6 K/UL    ABS. MONOCYTES 0.7 0.1 - 1.3 K/UL    ABS. EOSINOPHILS 0.1 0.0 - 0.8 K/UL    ABS. BASOPHILS 0.0 0.0 - 0.2 K/UL    ABS. IMM.  GRANS. 0.0 0.0 - 0.5 K/UL       Discharge Exam:  Patient Vitals for the past 24 hrs:   Temp Pulse Resp BP SpO2   02/28/20 1158 98.1 °F (36.7 °C) 89 18 (!) 132/96 95 %   02/28/20 0747 98.4 °F (36.9 °C) 94 16 129/89 97 %   02/28/20 0308 98.5 °F (36.9 °C) 93 18 110/76 97 %   02/28/20 0019 97.4 °F (36.3 °C) 90 20 (!) 136/97 94 %   02/27/20 1915 98.9 °F (37.2 °C) 85 18 125/90 95 %     Oxygen Therapy  O2 Sat (%): 95 % (02/28/20 1158)  Pulse via Oximetry: 87 beats per minute (02/26/20 1015)  O2 Device: Nasal cannula (02/26/20 1015)  ETCO2 (mmHg): 1.5 mmHg (02/26/20 1015)  No intake or output data in the 24 hours ending 02/28/20 1500    General:    Well nourished. Alert. No distress. Eyes:   Normal sclera. Extraocular movements intact. ENT:  Normocephalic, atraumatic. Moist mucous membranes  CV:   Regular rate and rhythm. No murmur, rub, or gallop. Lungs:  Clear to auscultation bilaterally. No wheezing, rhonchi, or rales. Abdomen: Soft, nontender, nondistended. Bowel sounds normal.   Extremities: Warm and dry. No cyanosis or edema. Neurologic: CN II-XII grossly intact. Sensation intact. Skin:     No rashes or jaundice. Psych:  Normal mood and affect. Discharge Info:   Current Discharge Medication List      CONTINUE these medications which have NOT CHANGED    Details   FLUoxetine (PROZAC) 20 mg capsule Take 20 mg by mouth daily. carisoprodoL (SOMA) 350 mg tablet Take 350 mg by mouth four (4) times daily. meloxicam (MOBIC) 7.5 mg tablet Take 7.5 mg by mouth daily. Disposition: home    Activity: Activity as tolerated  Diet: DIET FULL LIQUID    Follow-up Appointments   Procedures    FOLLOW UP VISIT Appointment in: One Week With PCP in 1-2 weeks. With PCP in 1-2 weeks. Standing Status:   Standing     Number of Occurrences:   1     Order Specific Question:   Appointment in     Answer: One Week         Follow-up Information     Follow up With Specialties Details Why Contact Info    Other, [facility]    Patient can only remember the practice name and not the physician              Signed:   Ana Rosa Otto MD

## 2020-02-28 NOTE — PROGRESS NOTES
Am shift assessment completed. Pt is alert and oriented x 4. Verbalizes needs well. Takes pills whole with thin liquids. Appetite poor has ate very little of his clear or full liquid diet. Has refused IV fluids since Wednesday late. States they make him sick. Causes him to have worse symptoms, makes him sick, etc... Upon assessment this am of the patient he stated that he told the night nurse that he has not urinated since yesterday evening. I told him that I would consult the Md regarding the situation. Will bladder scan and let the Md know.

## 2020-02-28 NOTE — PROGRESS NOTES
Discharge instructions given to patient copy provided along with educational material. No Rxs given. IV removed tip intact pressure dsg applied. Opportunity given for questions.

## 2020-02-28 NOTE — PROGRESS NOTES
Interdisciplinary team rounds were held 2/28/2020 with the following team members:Care Management, Nursing, Nutrition, Pharmacy, Physical Therapy and Physician. Plan of care discussed. See clinical pathway and/or care plan for interventions and desired outcomes.

## 2020-02-28 NOTE — DISCHARGE INSTRUCTIONS
DISCHARGE SUMMARY from Nurse    PATIENT INSTRUCTIONS:    After general anesthesia or intravenous sedation, for 24 hours or while taking prescription Narcotics:  · Limit your activities  · Do not drive and operate hazardous machinery  · Do not make important personal or business decisions  · Do  not drink alcoholic beverages  · If you have not urinated within 8 hours after discharge, please contact your surgeon on call. Report the following to your surgeon:  · Excessive pain, swelling, redness or odor of or around the surgical area  · Temperature over 100.5  · Nausea and vomiting lasting longer than 4 hours or if unable to take medications  · Any signs of decreased circulation or nerve impairment to extremity: change in color, persistent  numbness, tingling, coldness or increase pain  · Any questions    What to do at Home:  Recommended activity: Activity as tolerated,    If you experience any of the above symptoms, please follow up with MD.    *  Please give a list of your current medications to your Primary Care Provider. *  Please update this list whenever your medications are discontinued, doses are      changed, or new medications (including over-the-counter products) are added. *  Please carry medication information at all times in case of emergency situations. These are general instructions for a healthy lifestyle:    No smoking/ No tobacco products/ Avoid exposure to second hand smoke  Surgeon General's Warning:  Quitting smoking now greatly reduces serious risk to your health.     Obesity, smoking, and sedentary lifestyle greatly increases your risk for illness    A healthy diet, regular physical exercise & weight monitoring are important for maintaining a healthy lifestyle    You may be retaining fluid if you have a history of heart failure or if you experience any of the following symptoms:  Weight gain of 3 pounds or more overnight or 5 pounds in a week, increased swelling in our hands or feet or shortness of breath while lying flat in bed. Please call your doctor as soon as you notice any of these symptoms; do not wait until your next office visit. The discharge information has been reviewed with the patient. The patient verbalized understanding. Discharge medications reviewed with the patient and appropriate educational materials and side effects teaching were provided.   ___________________________________________________________________________________________________________________________________

## 2020-02-29 LAB
BACTERIA SPEC CULT: NORMAL
SERVICE CMNT-IMP: NORMAL

## 2020-03-02 LAB
RESULT 1, 080259: NORMAL
SPECIMEN SOURCE: NORMAL
YERSINIA SPEC CULT: NORMAL

## 2020-03-04 LAB
O+P SPEC MICRO: NORMAL
O+P STL CONC: NORMAL
SPECIMEN SOURCE: NORMAL

## 2023-10-23 NOTE — PROGRESS NOTES
Patient states he has not voided since late yesterday afternoon. Patient states he is drinking \"plenty of water\", denies discomfort or urge to void. Dr. Karen Gupta notified via [place]. No new orders received. Adult